# Patient Record
Sex: FEMALE | Race: WHITE | Employment: FULL TIME | URBAN - METROPOLITAN AREA
[De-identification: names, ages, dates, MRNs, and addresses within clinical notes are randomized per-mention and may not be internally consistent; named-entity substitution may affect disease eponyms.]

---

## 2017-05-16 ENCOUNTER — OFFICE VISIT (OUTPATIENT)
Dept: FAMILY MEDICINE CLINIC | Age: 47
End: 2017-05-16
Payer: COMMERCIAL

## 2017-05-16 VITALS
DIASTOLIC BLOOD PRESSURE: 70 MMHG | BODY MASS INDEX: 23.01 KG/M2 | WEIGHT: 146.6 LBS | HEIGHT: 67 IN | SYSTOLIC BLOOD PRESSURE: 122 MMHG | HEART RATE: 68 BPM

## 2017-05-16 DIAGNOSIS — Z51.81 MEDICATION MONITORING ENCOUNTER: ICD-10-CM

## 2017-05-16 DIAGNOSIS — Z13.220 SCREENING FOR LIPID DISORDERS: ICD-10-CM

## 2017-05-16 DIAGNOSIS — Z00.00 ROUTINE GENERAL MEDICAL EXAMINATION AT A HEALTH CARE FACILITY: Primary | ICD-10-CM

## 2017-05-16 PROCEDURE — 99396 PREV VISIT EST AGE 40-64: CPT | Performed by: FAMILY MEDICINE

## 2017-05-16 RX ORDER — MONTELUKAST SODIUM 10 MG/1
10 TABLET ORAL DAILY
Qty: 90 TABLET | Refills: 3 | Status: SHIPPED | OUTPATIENT
Start: 2017-05-16 | End: 2018-07-05 | Stop reason: ALTCHOICE

## 2017-05-16 ASSESSMENT — ENCOUNTER SYMPTOMS
BLOOD IN STOOL: 0
NAUSEA: 0
ABDOMINAL PAIN: 0
COLOR CHANGE: 0
EYE DISCHARGE: 0
WHEEZING: 0
ABDOMINAL DISTENTION: 0
EYE PAIN: 0
PHOTOPHOBIA: 0
EYE REDNESS: 0
FACIAL SWELLING: 0
RHINORRHEA: 0
SINUS PRESSURE: 0
DIARRHEA: 0
VOMITING: 0
EYE ITCHING: 0
CHEST TIGHTNESS: 0
SHORTNESS OF BREATH: 0
SORE THROAT: 0
BACK PAIN: 0
COUGH: 0
CONSTIPATION: 0
VOICE CHANGE: 0

## 2017-05-18 ENCOUNTER — HOSPITAL ENCOUNTER (OUTPATIENT)
Age: 47
Setting detail: SPECIMEN
Discharge: HOME | End: 2017-05-18
Payer: COMMERCIAL

## 2017-05-18 DIAGNOSIS — Z13.220 SCREENING FOR LIPID DISORDERS: ICD-10-CM

## 2017-05-18 DIAGNOSIS — Z51.81 MEDICATION MONITORING ENCOUNTER: ICD-10-CM

## 2017-05-18 LAB
ALBUMIN SERPL-MCNC: 4.2 G/DL (ref 3.5–5.2)
ALBUMIN/GLOBULIN RATIO: 1.5 (ref 1–2.5)
ALP BLD-CCNC: 64 U/L (ref 35–104)
ALT SERPL-CCNC: 15 U/L (ref 5–33)
ANION GAP SERPL CALCULATED.3IONS-SCNC: 11 MMOL/L (ref 9–17)
AST SERPL-CCNC: 17 U/L
BILIRUB SERPL-MCNC: 0.24 MG/DL (ref 0.3–1.2)
BUN BLDV-MCNC: 15 MG/DL (ref 6–20)
BUN/CREAT BLD: ABNORMAL (ref 9–20)
CALCIUM SERPL-MCNC: 9.5 MG/DL (ref 8.6–10.4)
CHLORIDE BLD-SCNC: 104 MMOL/L (ref 98–107)
CHOLESTEROL/HDL RATIO: 3
CHOLESTEROL: 180 MG/DL
CO2: 26 MMOL/L (ref 20–31)
CREAT SERPL-MCNC: 0.61 MG/DL (ref 0.5–0.9)
GFR AFRICAN AMERICAN: >60 ML/MIN
GFR NON-AFRICAN AMERICAN: >60 ML/MIN
GFR SERPL CREATININE-BSD FRML MDRD: ABNORMAL ML/MIN/{1.73_M2}
GFR SERPL CREATININE-BSD FRML MDRD: ABNORMAL ML/MIN/{1.73_M2}
GLUCOSE BLD-MCNC: 92 MG/DL (ref 70–99)
HDLC SERPL-MCNC: 61 MG/DL
LDL CHOLESTEROL: 108 MG/DL (ref 0–130)
POTASSIUM SERPL-SCNC: 4.6 MMOL/L (ref 3.7–5.3)
SODIUM BLD-SCNC: 141 MMOL/L (ref 135–144)
TOTAL PROTEIN: 7 G/DL (ref 6.4–8.3)
TRIGL SERPL-MCNC: 53 MG/DL
VLDLC SERPL CALC-MCNC: NORMAL MG/DL (ref 1–30)

## 2017-06-15 ENCOUNTER — OFFICE VISIT (OUTPATIENT)
Dept: FAMILY MEDICINE CLINIC | Age: 47
End: 2017-06-15
Payer: COMMERCIAL

## 2017-06-15 VITALS
WEIGHT: 150.2 LBS | DIASTOLIC BLOOD PRESSURE: 72 MMHG | HEART RATE: 54 BPM | BODY MASS INDEX: 24.14 KG/M2 | OXYGEN SATURATION: 99 % | SYSTOLIC BLOOD PRESSURE: 100 MMHG | HEIGHT: 66 IN

## 2017-06-15 DIAGNOSIS — G89.29 CHRONIC BILATERAL LOW BACK PAIN WITH BILATERAL SCIATICA: Primary | ICD-10-CM

## 2017-06-15 DIAGNOSIS — M54.41 CHRONIC BILATERAL LOW BACK PAIN WITH BILATERAL SCIATICA: Primary | ICD-10-CM

## 2017-06-15 DIAGNOSIS — M54.42 CHRONIC BILATERAL LOW BACK PAIN WITH BILATERAL SCIATICA: Primary | ICD-10-CM

## 2017-06-15 PROCEDURE — 99213 OFFICE O/P EST LOW 20 MIN: CPT | Performed by: FAMILY MEDICINE

## 2017-06-15 RX ORDER — PREDNISONE 50 MG/1
50 TABLET ORAL EVERY MORNING
Qty: 10 TABLET | Refills: 0 | Status: SHIPPED | OUTPATIENT
Start: 2017-06-15 | End: 2017-06-25

## 2017-06-15 ASSESSMENT — ENCOUNTER SYMPTOMS
SHORTNESS OF BREATH: 0
SORE THROAT: 0
EYE PAIN: 0
COUGH: 0
CHEST TIGHTNESS: 0
SINUS PRESSURE: 0
EYE REDNESS: 0
RHINORRHEA: 0
EYE ITCHING: 0
WHEEZING: 0
NAUSEA: 0
COLOR CHANGE: 0
BACK PAIN: 0
CONSTIPATION: 0
DIARRHEA: 0
PHOTOPHOBIA: 0
VOICE CHANGE: 0
ABDOMINAL DISTENTION: 0
EYE DISCHARGE: 0
FACIAL SWELLING: 0
BLOOD IN STOOL: 0
VOMITING: 0
ABDOMINAL PAIN: 0

## 2017-06-15 ASSESSMENT — PATIENT HEALTH QUESTIONNAIRE - PHQ9
SUM OF ALL RESPONSES TO PHQ9 QUESTIONS 1 & 2: 0
1. LITTLE INTEREST OR PLEASURE IN DOING THINGS: 0
2. FEELING DOWN, DEPRESSED OR HOPELESS: 0
SUM OF ALL RESPONSES TO PHQ QUESTIONS 1-9: 0

## 2017-10-26 ENCOUNTER — PATIENT MESSAGE (OUTPATIENT)
Dept: FAMILY MEDICINE CLINIC | Age: 47
End: 2017-10-26

## 2017-10-27 ENCOUNTER — PATIENT MESSAGE (OUTPATIENT)
Dept: FAMILY MEDICINE CLINIC | Age: 47
End: 2017-10-27

## 2017-10-27 NOTE — TELEPHONE ENCOUNTER
From: Ara Strickland  To: Dariana Rudolph MD  Sent: 10/27/2017 4:27 PM EDT  Subject: Non-Urgent Medical Question    Thank you [facility]  ----- Message -----  From: Dominique Judge  Sent: 10/27/2017 10:08 AM EDT  To: Ara Strickland  Subject: RE: Non-Urgent Medical Question  Thanks for the information. I have updated your Health Maintenance in your chart. Take care,  [facility]    ----- Message -----   From: Ara Strickland   Sent: 10/26/2017 7:10 PM EDT   To: Dariana Rudolph MD  Subject: Non-Urgent Medical Question    Just want to update my chart. Dr. Bari Hanna did my pap smear April 11th. Had my flu shot October 5th.  Scheduled for a mammogram on November 21st.    Thank you,  Ara Strickland

## 2017-10-27 NOTE — TELEPHONE ENCOUNTER
From: Marcelle Charles  To: Gabriella Woodruff MD  Sent: 10/26/2017 7:10 PM EDT  Subject: Non-Urgent Medical Question    Just want to update my chart. Dr. Gara Hodgkin did my pap smear April 11th. Had my flu shot October 5th.  Scheduled for a mammogram on November 21st.    Thank you,  Marcelle Charles

## 2018-05-29 ENCOUNTER — PATIENT MESSAGE (OUTPATIENT)
Dept: FAMILY MEDICINE CLINIC | Age: 48
End: 2018-05-29

## 2018-07-05 ENCOUNTER — OFFICE VISIT (OUTPATIENT)
Dept: FAMILY MEDICINE CLINIC | Age: 48
End: 2018-07-05
Payer: COMMERCIAL

## 2018-07-05 ENCOUNTER — HOSPITAL ENCOUNTER (OUTPATIENT)
Age: 48
Setting detail: SPECIMEN
Discharge: HOME | End: 2018-07-05
Payer: COMMERCIAL

## 2018-07-05 VITALS
DIASTOLIC BLOOD PRESSURE: 72 MMHG | HEIGHT: 67 IN | OXYGEN SATURATION: 98 % | WEIGHT: 164.2 LBS | BODY MASS INDEX: 25.77 KG/M2 | HEART RATE: 53 BPM | SYSTOLIC BLOOD PRESSURE: 114 MMHG

## 2018-07-05 DIAGNOSIS — Z00.00 EXAMINATION, MEDICAL, GENERAL: Primary | ICD-10-CM

## 2018-07-05 DIAGNOSIS — Z13.1 SCREENING FOR DIABETES MELLITUS: ICD-10-CM

## 2018-07-05 DIAGNOSIS — Z00.00 EXAMINATION, MEDICAL, GENERAL: ICD-10-CM

## 2018-07-05 DIAGNOSIS — Z13.220 SCREENING FOR LIPID DISORDERS: ICD-10-CM

## 2018-07-05 DIAGNOSIS — Z23 IMMUNIZATION DUE: ICD-10-CM

## 2018-07-05 PROBLEM — J30.89 CHRONIC NON-SEASONAL ALLERGIC RHINITIS: Status: ACTIVE | Noted: 2018-07-05

## 2018-07-05 LAB
CHOLESTEROL/HDL RATIO: 3.1
CHOLESTEROL: 178 MG/DL
GLUCOSE BLD-MCNC: 85 MG/DL (ref 70–99)
HDLC SERPL-MCNC: 58 MG/DL
LDL CHOLESTEROL: 106 MG/DL (ref 0–130)
TRIGL SERPL-MCNC: 69 MG/DL
VLDLC SERPL CALC-MCNC: NORMAL MG/DL (ref 1–30)

## 2018-07-05 PROCEDURE — 99396 PREV VISIT EST AGE 40-64: CPT | Performed by: FAMILY MEDICINE

## 2018-07-05 PROCEDURE — 90471 IMMUNIZATION ADMIN: CPT | Performed by: FAMILY MEDICINE

## 2018-07-05 PROCEDURE — 90715 TDAP VACCINE 7 YRS/> IM: CPT | Performed by: FAMILY MEDICINE

## 2018-07-05 ASSESSMENT — ENCOUNTER SYMPTOMS
NAUSEA: 0
BACK PAIN: 0
COUGH: 0
COLOR CHANGE: 0
SINUS PRESSURE: 0
RHINORRHEA: 0
SORE THROAT: 0
WHEEZING: 0
CHEST TIGHTNESS: 0
EYE DISCHARGE: 0
PHOTOPHOBIA: 0
ABDOMINAL DISTENTION: 0
EYE PAIN: 0
VOMITING: 0
VOICE CHANGE: 0
EYE REDNESS: 0
ABDOMINAL PAIN: 0
EYE ITCHING: 0
CONSTIPATION: 0
DIARRHEA: 0
SHORTNESS OF BREATH: 0
FACIAL SWELLING: 0
BLOOD IN STOOL: 0

## 2018-07-05 ASSESSMENT — PATIENT HEALTH QUESTIONNAIRE - PHQ9
SUM OF ALL RESPONSES TO PHQ QUESTIONS 1-9: 0
1. LITTLE INTEREST OR PLEASURE IN DOING THINGS: 0
2. FEELING DOWN, DEPRESSED OR HOPELESS: 0
SUM OF ALL RESPONSES TO PHQ9 QUESTIONS 1 & 2: 0

## 2018-07-05 NOTE — PROGRESS NOTES
Constitutional: She is oriented to person, place, and time. She appears well-developed and well-nourished. She does not appear ill. No distress. HENT:   Head: Normocephalic and atraumatic. Right Ear: External ear normal.   Left Ear: External ear normal.   Nose: Nose normal. No mucosal edema or rhinorrhea. Mouth/Throat: Mucous membranes are normal. No oropharyngeal exudate, posterior oropharyngeal edema or posterior oropharyngeal erythema. Eyes: EOM are normal. Pupils are equal, round, and reactive to light. Right eye exhibits no discharge. Left eye exhibits no discharge. No scleral icterus. Neck: Trachea normal and normal range of motion. Neck supple. No JVD present. Carotid bruit is not present. No tracheal deviation present. No thyromegaly present. Cardiovascular: Normal rate, regular rhythm, S1 normal, S2 normal, normal heart sounds and normal pulses. Exam reveals no gallop, no S3, no S4, no distant heart sounds and no friction rub. No murmur heard. Pulmonary/Chest: No respiratory distress. She has no decreased breath sounds. She has no wheezes. She has no rhonchi. She has no rales. Abdominal: Soft. Normal appearance and bowel sounds are normal. There is no hepatosplenomegaly. There is no tenderness. There is no CVA tenderness. No hernia. Lymphadenopathy:     She has no cervical adenopathy. Right cervical: No superficial cervical adenopathy present. Left cervical: No superficial cervical adenopathy present. Neurological: She is alert and oriented to person, place, and time. She has normal strength. No cranial nerve deficit or sensory deficit. Coordination and gait normal.   Reflex Scores:       Brachioradialis reflexes are 2+ on the right side and 2+ on the left side. Patellar reflexes are 2+ on the right side and 2+ on the left side. Achilles reflexes are 2+ on the right side and 2+ on the left side. Skin: Skin is warm and dry. No lesion and no rash noted.  She is not diaphoretic. No cyanosis. Nails show no clubbing. Psychiatric: She has a normal mood and affect. Her speech is normal and behavior is normal. Judgment and thought content normal. Cognition and memory are normal.     Vitals:    07/05/18 0818   BP: 114/72   Pulse: 53   SpO2: 98%   Weight: 164 lb 3.2 oz (74.5 kg)   Height: 5' 6.5\" (1.689 m)         Assessment:          Plan:      1. Examination, medical, general  - I generally recommend that people of all ages try to get 150 minutes of physical activity per week and it doesnt matter how this totals up, in other words 30 minutes 5 days per week is as good as 50 minutes 3 days a week and so on. The level of activity should be such that it is able to get your heart rate up to 100 or more, for example a brisk walk should achieve this rate. - In terms of diet, I generally recommend trying to avoid processed foods wherever possible (anything that comes in a can or a box) which can be achieved by sticking to the outside walls of the grocery store where generally you will find fresh fruits/vegetables, meats, dairy, and frozen foods.    - Try to avoid starches in the diet where possible and minimize bread, rice, potatoes, and pasta in the diet. Specifically try to avoid gluten, which even in people that dont have a kerry allergy, causes havoc in the small intestine and alters absorption of nutrients which can in turn lead to obesity.   - Try to achieve a regular sleep schedule, waking and laying down at the same time each night. Most people need 7 hours per night plus or minus 2 hours. You will know that youre getting enough because you will wake feeling refreshed and not need to sleep in to catch up on weekends.    - Make sure that you dont neglect your skin, I recommend an SPF 30 or higher sun screen any time that you plan to be in the sun for more than 20 minutes, even in the winter or on cloudy days (keep in mind that UV light penetrates clouds and can cause burns even on cloudy days). - Finally, make sure that you always have something to look forward to whether this is a vacation, a special event, or just a weekend off work. Having something to look forward to helps to maintain positive focus and is good for mental health. - Tdap (age 10y-63y) IM (ADACEL)  - Lipid Panel; Future  - Glucose; Future    2. Screening for lipid disorders  - Lipid Panel; Future    3. Screening for diabetes mellitus  - Glucose; Future    4.  Immunization due  - Tdap (age 10y-63y) IM (ADACEL)

## 2018-07-05 NOTE — PATIENT INSTRUCTIONS
brisk walk again on Thursday, resistance training on your core on Friday, brisk walk again on Saturday, and then start the cycle over. Swimming is an example of exercise that builds muscle and works your heart at the same time. You may want to consider subscribing to a magazine such as [address] or [address] which both have great diet and exercise tips in them.

## 2018-08-26 ENCOUNTER — PATIENT MESSAGE (OUTPATIENT)
Dept: FAMILY MEDICINE CLINIC | Age: 48
End: 2018-08-26

## 2019-08-14 ENCOUNTER — OFFICE VISIT (OUTPATIENT)
Dept: FAMILY MEDICINE CLINIC | Age: 49
End: 2019-08-14
Payer: COMMERCIAL

## 2019-08-14 VITALS
SYSTOLIC BLOOD PRESSURE: 106 MMHG | HEART RATE: 53 BPM | HEIGHT: 66 IN | DIASTOLIC BLOOD PRESSURE: 60 MMHG | BODY MASS INDEX: 25.65 KG/M2 | OXYGEN SATURATION: 98 % | WEIGHT: 159.6 LBS

## 2019-08-14 DIAGNOSIS — Z13.220 SCREENING FOR LIPID DISORDERS: ICD-10-CM

## 2019-08-14 DIAGNOSIS — Z00.01 ABNORMAL PHYSICAL EVALUATION: Primary | ICD-10-CM

## 2019-08-14 DIAGNOSIS — Z11.4 SCREENING FOR HIV (HUMAN IMMUNODEFICIENCY VIRUS): ICD-10-CM

## 2019-08-14 DIAGNOSIS — Z51.81 MEDICATION MONITORING ENCOUNTER: ICD-10-CM

## 2019-08-14 PROCEDURE — 99396 PREV VISIT EST AGE 40-64: CPT | Performed by: FAMILY MEDICINE

## 2019-08-14 ASSESSMENT — ENCOUNTER SYMPTOMS
SORE THROAT: 0
EYE REDNESS: 0
VOMITING: 0
EYE DISCHARGE: 0
FACIAL SWELLING: 0
SHORTNESS OF BREATH: 0
COLOR CHANGE: 0
RHINORRHEA: 0
CHEST TIGHTNESS: 0
PHOTOPHOBIA: 0
SINUS PRESSURE: 0
DIARRHEA: 0
NAUSEA: 0
VOICE CHANGE: 0
COUGH: 0
BACK PAIN: 0
EYE PAIN: 0
ABDOMINAL PAIN: 0
ABDOMINAL DISTENTION: 0
BLOOD IN STOOL: 0
WHEEZING: 0
EYE ITCHING: 0
CONSTIPATION: 0

## 2019-08-14 ASSESSMENT — PATIENT HEALTH QUESTIONNAIRE - PHQ9
SUM OF ALL RESPONSES TO PHQ QUESTIONS 1-9: 0
1. LITTLE INTEREST OR PLEASURE IN DOING THINGS: 0
SUM OF ALL RESPONSES TO PHQ9 QUESTIONS 1 & 2: 0
SUM OF ALL RESPONSES TO PHQ QUESTIONS 1-9: 0
2. FEELING DOWN, DEPRESSED OR HOPELESS: 0

## 2019-08-14 NOTE — PROGRESS NOTES
refreshed and not need to sleep in to catch up on weekends. - Make sure that you dont neglect your skin, I recommend an SPF 30 or higher sun screen any time that you plan to be in the sun for more than 20 minutes, even in the winter or on cloudy days (keep in mind that UV light penetrates clouds and can cause burns even on cloudy days). - Finally, make sure that you always have something to look forward to whether this is a vacation, a special event, or just a weekend off work. Having something to look forward to helps to maintain positive focus and is good for mental health. - Lipid Panel; Future  - Comprehensive Metabolic Panel; Future    2. Screening for lipid disorders  - Lipid Panel; Future    3. Screening for HIV (human immunodeficiency virus)  - HIV Screen; Future    4. Medication monitoring encounter  - Comprehensive Metabolic Panel;  Future

## 2019-08-14 NOTE — PATIENT INSTRUCTIONS
For the tail bone pain, try using 600mg of ibuprofen every 6 hours for 10 days (three over the counter pills per dose) and when on poorly cushioned chairs make sure there is lumbar support to prevent placing pressure on the tailbone. If there is any ear pain beyond about 7 days using the nasacort, call or send me a Tunespeak message to let me know. - I generally recommend that people of all ages try to get 150 minutes of physical activity per week and it doesnt matter how this totals up, in other words 30 minutes 5 days per week is as good as 50 minutes 3 days a week and so on. The level of activity should be such that it is able to get your heart rate up to 100 or more, for example a brisk walk should achieve this rate. - In terms of diet, I generally recommend trying to avoid processed foods wherever possible (anything that comes in a can or a box) which can be achieved by sticking to the outside walls of the grocery store where generally you will find fresh fruits/vegetables, meats, dairy, and frozen foods.    - Try to avoid starches in the diet where possible and minimize bread, rice, potatoes, and pasta in the diet. Specifically try to avoid gluten, which even in people that dont have a kerry allergy, causes havoc in the small intestine and alters absorption of nutrients which can in turn lead to obesity.   - Try to achieve a regular sleep schedule, waking and laying down at the same time each night. Most people need 7 hours per night plus or minus 2 hours. You will know that youre getting enough because you will wake feeling refreshed and not need to sleep in to catch up on weekends. - Make sure that you dont neglect your skin, I recommend an SPF 30 or higher sun screen any time that you plan to be in the sun for more than 20 minutes, even in the winter or on cloudy days (keep in mind that UV light penetrates clouds and can cause burns even on cloudy days).    - Finally, make sure that you

## 2019-08-22 ENCOUNTER — HOSPITAL ENCOUNTER (OUTPATIENT)
Age: 49
Discharge: HOME | End: 2019-08-22
Payer: COMMERCIAL

## 2019-08-22 DIAGNOSIS — Z00.01 ABNORMAL PHYSICAL EVALUATION: ICD-10-CM

## 2019-08-22 DIAGNOSIS — Z13.220 SCREENING FOR LIPID DISORDERS: ICD-10-CM

## 2019-08-22 DIAGNOSIS — Z51.81 MEDICATION MONITORING ENCOUNTER: ICD-10-CM

## 2019-08-22 DIAGNOSIS — Z11.4 SCREENING FOR HIV (HUMAN IMMUNODEFICIENCY VIRUS): ICD-10-CM

## 2019-08-22 LAB
ALBUMIN SERPL-MCNC: 4.4 G/DL (ref 3.5–5.2)
ALBUMIN/GLOBULIN RATIO: 1.8 (ref 1–2.5)
ALP BLD-CCNC: 59 U/L (ref 35–104)
ALT SERPL-CCNC: 18 U/L (ref 5–33)
ANION GAP SERPL CALCULATED.3IONS-SCNC: 14 MMOL/L (ref 9–17)
AST SERPL-CCNC: 18 U/L
BILIRUB SERPL-MCNC: 0.45 MG/DL (ref 0.3–1.2)
BUN BLDV-MCNC: 14 MG/DL (ref 6–20)
BUN/CREAT BLD: NORMAL (ref 9–20)
CALCIUM SERPL-MCNC: 9.5 MG/DL (ref 8.6–10.4)
CHLORIDE BLD-SCNC: 106 MMOL/L (ref 98–107)
CHOLESTEROL/HDL RATIO: 3.1
CHOLESTEROL: 184 MG/DL
CO2: 24 MMOL/L (ref 20–31)
CREAT SERPL-MCNC: 0.59 MG/DL (ref 0.5–0.9)
GFR AFRICAN AMERICAN: >60 ML/MIN
GFR NON-AFRICAN AMERICAN: >60 ML/MIN
GFR SERPL CREATININE-BSD FRML MDRD: NORMAL ML/MIN/{1.73_M2}
GFR SERPL CREATININE-BSD FRML MDRD: NORMAL ML/MIN/{1.73_M2}
GLUCOSE BLD-MCNC: 96 MG/DL (ref 70–99)
HDLC SERPL-MCNC: 60 MG/DL
HIV AG/AB: NONREACTIVE
LDL CHOLESTEROL: 113 MG/DL (ref 0–130)
POTASSIUM SERPL-SCNC: 4.3 MMOL/L (ref 3.7–5.3)
SODIUM BLD-SCNC: 144 MMOL/L (ref 135–144)
TOTAL PROTEIN: 6.9 G/DL (ref 6.4–8.3)
TRIGL SERPL-MCNC: 57 MG/DL
VLDLC SERPL CALC-MCNC: NORMAL MG/DL (ref 1–30)

## 2019-08-22 PROCEDURE — 87389 HIV-1 AG W/HIV-1&-2 AB AG IA: CPT

## 2019-08-22 PROCEDURE — 36415 COLL VENOUS BLD VENIPUNCTURE: CPT

## 2019-08-22 PROCEDURE — 80053 COMPREHEN METABOLIC PANEL: CPT

## 2019-08-22 PROCEDURE — 80061 LIPID PANEL: CPT

## 2019-08-26 ENCOUNTER — TELEPHONE (OUTPATIENT)
Dept: FAMILY MEDICINE CLINIC | Age: 49
End: 2019-08-26

## 2020-03-05 ENCOUNTER — OFFICE VISIT (OUTPATIENT)
Dept: PRIMARY CARE CLINIC | Age: 50
End: 2020-03-05
Payer: COMMERCIAL

## 2020-03-05 VITALS
HEART RATE: 58 BPM | OXYGEN SATURATION: 98 % | SYSTOLIC BLOOD PRESSURE: 120 MMHG | WEIGHT: 172.8 LBS | BODY MASS INDEX: 27.12 KG/M2 | HEIGHT: 67 IN | DIASTOLIC BLOOD PRESSURE: 70 MMHG | RESPIRATION RATE: 16 BRPM

## 2020-03-05 PROCEDURE — 99386 PREV VISIT NEW AGE 40-64: CPT | Performed by: NURSE PRACTITIONER

## 2020-03-05 RX ORDER — ASCORBIC ACID 500 MG
500 TABLET ORAL DAILY
COMMUNITY

## 2020-03-05 RX ORDER — SCOLOPAMINE TRANSDERMAL SYSTEM 1 MG/1
1 PATCH, EXTENDED RELEASE TRANSDERMAL
Qty: 10 PATCH | Refills: 11 | Status: SHIPPED | OUTPATIENT
Start: 2020-03-05 | End: 2021-01-14 | Stop reason: ALTCHOICE

## 2020-03-05 RX ORDER — IBUPROFEN 200 MG
200 TABLET ORAL EVERY 6 HOURS PRN
COMMUNITY

## 2020-03-05 ASSESSMENT — ENCOUNTER SYMPTOMS
SHORTNESS OF BREATH: 0
COUGH: 0
BACK PAIN: 0
ABDOMINAL PAIN: 0

## 2020-03-05 NOTE — PROGRESS NOTES
standard drinks      Current Outpatient Medications   Medication Sig Dispense Refill    vitamin C (ASCORBIC ACID) 500 MG tablet Take 500 mg by mouth daily      VITAMIN D PO Take by mouth      Glucosamine-MSM-Hyaluronic Acd (JOINT HEALTH PO) Take by mouth      Doxylamine Succinate, Sleep, (UNISOM PO) Take by mouth      ibuprofen (ADVIL;MOTRIN) 200 MG tablet Take 200 mg by mouth every 6 hours as needed for Pain       No current facility-administered medications for this visit. No Known Allergies    Health Maintenance   Topic Date Due    Shingles Vaccine (1 of 2) 12/05/2021    Lipid screen  08/22/2024    DTaP/Tdap/Td vaccine (2 - Td) 07/05/2028    Flu vaccine  Completed    HIV screen  Completed    Hepatitis A vaccine  Aged Out    Hepatitis B vaccine  Aged Out    Hib vaccine  Aged Out    Meningococcal (ACWY) vaccine  Aged Out    Pneumococcal 0-64 years Vaccine  Aged Out       Subjective:      Review of Systems   Constitutional: Negative for chills, fatigue and fever. HENT: Negative for congestion. Eyes: Negative for visual disturbance. Respiratory: Negative for cough and shortness of breath. Cardiovascular: Negative for chest pain and palpitations. Gastrointestinal: Negative for abdominal pain. Genitourinary: Negative for dysuria. Musculoskeletal: Negative for back pain. Neurological: Negative for dizziness, numbness and headaches. Psychiatric/Behavioral: Negative for self-injury, sleep disturbance and suicidal ideas. The patient is not nervous/anxious. Objective:     Physical Exam  Vitals signs and nursing note reviewed. Constitutional:       Appearance: She is well-developed. HENT:      Head: Normocephalic and atraumatic. Eyes:      Pupils: Pupils are equal, round, and reactive to light. Neck:      Musculoskeletal: Normal range of motion and neck supple. Cardiovascular:      Rate and Rhythm: Normal rate and regular rhythm. Heart sounds: Normal heart sounds. maintenance. Instructedto continue current medications, diet and exercise. Patient agreed with treatmentplan. Follow up as directed.      Electronicallysigned by JOHN Farris CNP on 3/5/2020 at 10:49 AM

## 2020-03-23 ENCOUNTER — OFFICE VISIT (OUTPATIENT)
Dept: PRIMARY CARE CLINIC | Age: 50
End: 2020-03-23
Payer: COMMERCIAL

## 2020-03-23 VITALS
OXYGEN SATURATION: 98 % | DIASTOLIC BLOOD PRESSURE: 62 MMHG | HEART RATE: 50 BPM | WEIGHT: 171.6 LBS | RESPIRATION RATE: 16 BRPM | SYSTOLIC BLOOD PRESSURE: 116 MMHG | HEIGHT: 66 IN | BODY MASS INDEX: 27.58 KG/M2 | TEMPERATURE: 97.9 F

## 2020-03-23 LAB — S PYO AG THROAT QL: NORMAL

## 2020-03-23 PROCEDURE — 87880 STREP A ASSAY W/OPTIC: CPT | Performed by: NURSE PRACTITIONER

## 2020-03-23 PROCEDURE — 99213 OFFICE O/P EST LOW 20 MIN: CPT | Performed by: NURSE PRACTITIONER

## 2020-03-23 RX ORDER — AZITHROMYCIN 250 MG/1
TABLET, FILM COATED ORAL
Qty: 6 TABLET | Refills: 0 | Status: SHIPPED | OUTPATIENT
Start: 2020-03-23 | End: 2020-04-02

## 2020-03-23 ASSESSMENT — ENCOUNTER SYMPTOMS
COUGH: 0
SORE THROAT: 1
SHORTNESS OF BREATH: 0
BACK PAIN: 0
ABDOMINAL PAIN: 0
SINUS PAIN: 1
SINUS PRESSURE: 1

## 2020-03-23 NOTE — LETTER
[facility]  [address]  [facility] [address]  Phone: [phone]  Fax: [phone]    JOHN Lynne CNP        March 23, 2020     Patient: Aranza Pantoja   YOB: 1970   Date of Visit: 3/23/2020       To Whom It May Concern: It is my medical opinion that Aranza Pantoja was seen in our office today. She may return to work on 3/24/20 without restrictions. If you have any questions or concerns, please don't hesitate to call.     Sincerely,        JOHN Lynne CNP

## 2020-03-23 NOTE — PROGRESS NOTES
[address] PRIMARY CARE  [facility] [address] [facility] [address]  [address] [address]  Dept: [phone]  Dept Fax: [phone]    Adriana Huffman is a 51 y.o. female who presentstoday for her medical conditions/complaints as noted below. Adriana Huffman is c/o of  Chief Complaint   Patient presents with    Pharyngitis     started about 2 weeks ago, hurts to swallow, followed by coughing          HPI:     Presents for acute visit  Sore throat and congestion x 1 week  No improvement with improvement with OTC  Continues to work, will need note that she is able to return    Denies any other problems/concerns      No results found for: LABA1C          ( goal A1C is < 7)   No results found for: LABMICR  LDL Cholesterol (mg/dL)   Date Value   2019 113   2018 106   2017 108     LDL Calculated (mg/dL)   Date Value   2013 117       (goal LDL is <100)   AST (U/L)   Date Value   2019 18     ALT (U/L)   Date Value   2019 18     BUN (mg/dL)   Date Value   2019 14     BP Readings from Last 3 Encounters:   20 116/62   20 120/70   19 106/60          ([id number])    History reviewed. No pertinent past medical history. Past Surgical History:   Procedure Laterality Date    HYSTERECTOMY, TOTAL ABDOMINAL      left ovarier        Family History   Problem Relation Age of Onset    Brain Cancer Father           Social History     Tobacco Use    Smoking status: Never Smoker    Smokeless tobacco: Never Used   Substance Use Topics    Alcohol use: Yes     Alcohol/week: 0.0 standard drinks      Current Outpatient Medications   Medication Sig Dispense Refill    azithromycin (ZITHROMAX) 250 MG tablet 2 tablets by mouth on day one.   Then, 1 tablet by mouth daily for days 2-5. 6 tablet 0    vitamin C (ASCORBIC ACID) 500 MG tablet Take 500 mg by mouth daily      VITAMIN D PO Take by mouth      Glucosamine-MSM-Hyaluronic Acd (JOINT HEALTH PO) Take by

## 2020-08-18 ENCOUNTER — HOSPITAL ENCOUNTER (OUTPATIENT)
Dept: GENERAL RADIOLOGY | Age: 50
Discharge: HOME | End: 2020-08-20
Payer: COMMERCIAL

## 2020-08-18 ENCOUNTER — HOSPITAL ENCOUNTER (OUTPATIENT)
Age: 50
Discharge: HOME | End: 2020-08-20
Payer: COMMERCIAL

## 2020-08-18 PROCEDURE — 72220 X-RAY EXAM SACRUM TAILBONE: CPT

## 2020-08-28 ENCOUNTER — HOSPITAL ENCOUNTER (OUTPATIENT)
Age: 50
Discharge: HOME | End: 2020-08-28
Payer: COMMERCIAL

## 2020-08-28 LAB
ALBUMIN SERPL-MCNC: 4.2 G/DL (ref 3.5–5.2)
ALBUMIN/GLOBULIN RATIO: 1.8 (ref 1–2.5)
ALP BLD-CCNC: 60 U/L (ref 35–104)
ALT SERPL-CCNC: 15 U/L (ref 5–33)
ANION GAP SERPL CALCULATED.3IONS-SCNC: 11 MMOL/L (ref 9–17)
AST SERPL-CCNC: 17 U/L
BILIRUB SERPL-MCNC: 0.44 MG/DL (ref 0.3–1.2)
BUN BLDV-MCNC: 15 MG/DL (ref 6–20)
BUN/CREAT BLD: ABNORMAL (ref 9–20)
CALCIUM SERPL-MCNC: 9.5 MG/DL (ref 8.6–10.4)
CHLORIDE BLD-SCNC: 106 MMOL/L (ref 98–107)
CHOLESTEROL/HDL RATIO: 3.7
CHOLESTEROL: 204 MG/DL
CO2: 25 MMOL/L (ref 20–31)
CREAT SERPL-MCNC: 0.62 MG/DL (ref 0.5–0.9)
GFR AFRICAN AMERICAN: >60 ML/MIN
GFR NON-AFRICAN AMERICAN: >60 ML/MIN
GFR SERPL CREATININE-BSD FRML MDRD: ABNORMAL ML/MIN/{1.73_M2}
GFR SERPL CREATININE-BSD FRML MDRD: ABNORMAL ML/MIN/{1.73_M2}
GLUCOSE BLD-MCNC: 105 MG/DL (ref 70–99)
HCT VFR BLD CALC: 41.2 % (ref 36.3–47.1)
HDLC SERPL-MCNC: 55 MG/DL
HEMOGLOBIN: 13.4 G/DL (ref 11.9–15.1)
LDL CHOLESTEROL: 135 MG/DL (ref 0–130)
MCH RBC QN AUTO: 30.3 PG (ref 25.2–33.5)
MCHC RBC AUTO-ENTMCNC: 32.5 G/DL (ref 28.4–34.8)
MCV RBC AUTO: 93.2 FL (ref 82.6–102.9)
NRBC AUTOMATED: 0 PER 100 WBC
PDW BLD-RTO: 12.5 % (ref 11.8–14.4)
PLATELET # BLD: 241 K/UL (ref 138–453)
PMV BLD AUTO: 11.6 FL (ref 8.1–13.5)
POTASSIUM SERPL-SCNC: 4.4 MMOL/L (ref 3.7–5.3)
RBC # BLD: 4.42 M/UL (ref 3.95–5.11)
SODIUM BLD-SCNC: 142 MMOL/L (ref 135–144)
TOTAL PROTEIN: 6.6 G/DL (ref 6.4–8.3)
TRIGL SERPL-MCNC: 71 MG/DL
TSH SERPL DL<=0.05 MIU/L-ACNC: 2.42 MIU/L (ref 0.3–5)
VLDLC SERPL CALC-MCNC: ABNORMAL MG/DL (ref 1–30)
WBC # BLD: 6.1 K/UL (ref 3.5–11.3)

## 2020-08-28 PROCEDURE — 36415 COLL VENOUS BLD VENIPUNCTURE: CPT

## 2020-08-28 PROCEDURE — 84443 ASSAY THYROID STIM HORMONE: CPT

## 2020-08-28 PROCEDURE — 85027 COMPLETE CBC AUTOMATED: CPT

## 2020-08-28 PROCEDURE — 80061 LIPID PANEL: CPT

## 2020-08-28 PROCEDURE — 80053 COMPREHEN METABOLIC PANEL: CPT

## 2020-12-10 ENCOUNTER — PATIENT MESSAGE (OUTPATIENT)
Dept: PRIMARY CARE CLINIC | Age: 50
End: 2020-12-10

## 2020-12-10 NOTE — TELEPHONE ENCOUNTER
From: Yesenia Marker  To: Chapin Brothers, APRN - CNP  Sent: 12/10/2020 9:00 AM EST  Subject: Non-Urgent Medical Question    Morning, so my biometric screening paper was not received for my health insurance. Is there anyway you can send it again and print a copy for me to  from your office today or tomorrow?     Thanks,  Yesenia Marker

## 2021-01-14 ENCOUNTER — OFFICE VISIT (OUTPATIENT)
Dept: PRIMARY CARE CLINIC | Age: 51
End: 2021-01-14
Payer: COMMERCIAL

## 2021-01-14 ENCOUNTER — HOSPITAL ENCOUNTER (OUTPATIENT)
Dept: GENERAL RADIOLOGY | Age: 51
Discharge: HOME | End: 2021-01-16
Payer: COMMERCIAL

## 2021-01-14 ENCOUNTER — HOSPITAL ENCOUNTER (OUTPATIENT)
Age: 51
Discharge: HOME | End: 2021-01-16
Payer: COMMERCIAL

## 2021-01-14 VITALS
DIASTOLIC BLOOD PRESSURE: 76 MMHG | OXYGEN SATURATION: 99 % | BODY MASS INDEX: 27.25 KG/M2 | HEIGHT: 67 IN | RESPIRATION RATE: 14 BRPM | SYSTOLIC BLOOD PRESSURE: 118 MMHG | WEIGHT: 173.6 LBS | HEART RATE: 63 BPM

## 2021-01-14 DIAGNOSIS — M25.551 RIGHT HIP PAIN: Primary | ICD-10-CM

## 2021-01-14 DIAGNOSIS — M25.552 LEFT HIP PAIN: ICD-10-CM

## 2021-01-14 DIAGNOSIS — M25.551 RIGHT HIP PAIN: ICD-10-CM

## 2021-01-14 PROCEDURE — 99214 OFFICE O/P EST MOD 30 MIN: CPT | Performed by: NURSE PRACTITIONER

## 2021-01-14 PROCEDURE — 73502 X-RAY EXAM HIP UNI 2-3 VIEWS: CPT

## 2021-01-14 ASSESSMENT — ENCOUNTER SYMPTOMS
ABDOMINAL PAIN: 0
SHORTNESS OF BREATH: 0
COUGH: 0
BACK PAIN: 0

## 2021-01-14 ASSESSMENT — PATIENT HEALTH QUESTIONNAIRE - PHQ9
SUM OF ALL RESPONSES TO PHQ QUESTIONS 1-9: 0
SUM OF ALL RESPONSES TO PHQ QUESTIONS 1-9: 0

## 2021-01-14 NOTE — PROGRESS NOTES
ALLERGY PO) Take by mouth      vitamin C (ASCORBIC ACID) 500 MG tablet Take 500 mg by mouth daily      VITAMIN D PO Take by mouth      Glucosamine-MSM-Hyaluronic Acd (JOINT HEALTH PO) Take by mouth      Doxylamine Succinate, Sleep, (UNISOM PO) Take by mouth      ibuprofen (ADVIL;MOTRIN) 200 MG tablet Take 200 mg by mouth every 6 hours as needed for Pain       No current facility-administered medications for this visit. No Known Allergies    Health Maintenance   Topic Date Due    Hepatitis C screen  1970    Diabetes screen  12/05/2011    Lipid screen  08/28/2025    DTaP/Tdap/Td vaccine (2 - Td) 07/05/2028    Flu vaccine  Completed    HIV screen  Completed    Hepatitis A vaccine  Aged Out    Hepatitis B vaccine  Aged Out    Hib vaccine  Aged Out    Meningococcal (ACWY) vaccine  Aged Out    Pneumococcal 0-64 years Vaccine  Aged Out       Subjective:      Review of Systems   Constitutional: Negative for chills, fatigue and fever. HENT: Negative for congestion. Eyes: Negative for visual disturbance. Respiratory: Negative for cough and shortness of breath. Cardiovascular: Negative for chest pain and palpitations. Gastrointestinal: Negative for abdominal pain. Genitourinary: Negative for dysuria. Musculoskeletal: Positive for arthralgias. Negative for back pain. Neurological: Negative for dizziness, numbness and headaches. Psychiatric/Behavioral: Negative for self-injury, sleep disturbance and suicidal ideas. The patient is not nervous/anxious. Objective:     Physical Exam  Vitals signs and nursing note reviewed. Constitutional:       Appearance: She is well-developed. HENT:      Head: Normocephalic and atraumatic. Eyes:      Pupils: Pupils are equal, round, and reactive to light. Neck:      Musculoskeletal: Normal range of motion and neck supple. Cardiovascular:      Rate and Rhythm: Normal rate and regular rhythm. Heart sounds: Normal heart sounds. Pulmonary:      Effort: Pulmonary effort is normal.      Breath sounds: Normal breath sounds. Abdominal:      General: Bowel sounds are normal.      Palpations: Abdomen is soft. Tenderness: There is no abdominal tenderness. Musculoskeletal: Normal range of motion. Skin:     General: Skin is warm and dry. Neurological:      Mental Status: She is alert and oriented to person, place, and time. Psychiatric:         Behavior: Behavior normal.         Thought Content: Thought content normal.         Judgment: Judgment normal.       /76   Pulse 63   Resp 14   Ht 5' 6.5\" (1.689 m)   Wt 173 lb 9.6 oz (78.7 kg)   SpO2 99%   Breastfeeding No   BMI 27.60 kg/m²     Assessment:       Diagnosis Orders   1. Right hip pain  XR HIP RIGHT (2-3 VIEWS)   2. Left hip pain  XR HIP LEFT (2-3 VIEWS)             Plan:      Return in about 6 months (around 7/14/2021) for annual exam.    1. Bilateral hip pain- Rx given for xray, follow up pending results. Return in six months for recheck/annual exam.    Orders Placed This Encounter   Procedures    XR HIP RIGHT (2-3 VIEWS)     Standing Status:   Future     Standing Expiration Date:   1/14/2022    XR HIP LEFT (2-3 VIEWS)     Standing Status:   Future     Standing Expiration Date:   1/14/2022      No orders of the defined types were placed in this encounter. Patient given educational materials - see patient instructions. Discussed use, benefit, and side effects of prescribed medications. All patientquestions answered. Pt voiced understanding. Reviewed health maintenance. Instructedto continue current medications, diet and exercise. Patient agreed with treatmentplan. Follow up as directed.      Electronicallysigned by JOHN Guallpa CNP on 1/14/2021 at 8:26 AM

## 2021-01-21 ENCOUNTER — OFFICE VISIT (OUTPATIENT)
Dept: ORTHOPEDIC SURGERY | Age: 51
End: 2021-01-21
Payer: COMMERCIAL

## 2021-01-21 ENCOUNTER — TELEPHONE (OUTPATIENT)
Dept: PRIMARY CARE CLINIC | Age: 51
End: 2021-01-21

## 2021-01-21 VITALS — WEIGHT: 173 LBS | BODY MASS INDEX: 27.8 KG/M2 | HEIGHT: 66 IN

## 2021-01-21 DIAGNOSIS — M53.3 COCCYXDYNIA: Primary | ICD-10-CM

## 2021-01-21 DIAGNOSIS — M25.552 LEFT HIP PAIN: ICD-10-CM

## 2021-01-21 DIAGNOSIS — M25.551 RIGHT HIP PAIN: Primary | ICD-10-CM

## 2021-01-21 PROCEDURE — 99203 OFFICE O/P NEW LOW 30 MIN: CPT | Performed by: ORTHOPAEDIC SURGERY

## 2021-01-21 PROCEDURE — 20552 NJX 1/MLT TRIGGER POINT 1/2: CPT | Performed by: ORTHOPAEDIC SURGERY

## 2021-01-21 RX ORDER — BETAMETHASONE SODIUM PHOSPHATE AND BETAMETHASONE ACETATE 3; 3 MG/ML; MG/ML
12 INJECTION, SUSPENSION INTRA-ARTICULAR; INTRALESIONAL; INTRAMUSCULAR; SOFT TISSUE ONCE
Status: COMPLETED | OUTPATIENT
Start: 2021-01-21 | End: 2021-01-21

## 2021-01-21 RX ORDER — LIDOCAINE HYDROCHLORIDE 10 MG/ML
2 INJECTION, SOLUTION INFILTRATION; PERINEURAL ONCE
Status: COMPLETED | OUTPATIENT
Start: 2021-01-21 | End: 2021-01-21

## 2021-01-21 RX ORDER — BUPIVACAINE HYDROCHLORIDE 5 MG/ML
2 INJECTION, SOLUTION PERINEURAL ONCE
Status: COMPLETED | OUTPATIENT
Start: 2021-01-21 | End: 2021-01-21

## 2021-01-21 RX ADMIN — BUPIVACAINE HYDROCHLORIDE 10 MG: 5 INJECTION, SOLUTION PERINEURAL at 15:51

## 2021-01-21 RX ADMIN — BETAMETHASONE SODIUM PHOSPHATE AND BETAMETHASONE ACETATE 12 MG: 3; 3 INJECTION, SUSPENSION INTRA-ARTICULAR; INTRALESIONAL; INTRAMUSCULAR; SOFT TISSUE at 15:50

## 2021-01-21 RX ADMIN — LIDOCAINE HYDROCHLORIDE 2 ML: 10 INJECTION, SOLUTION INFILTRATION; PERINEURAL at 15:52

## 2021-01-21 NOTE — TELEPHONE ENCOUNTER
Patient calling asking if you can make a referral for patient to see someone for bilateral hip pain. Please advise and notify patient of results.

## 2021-01-21 NOTE — PROGRESS NOTES
Patient ID: Joe Walker is a 53 y.o. female. Chief Complaint   Patient presents with    New Patient     Tailbone pain         HPI     Patient presents with progressive coccydynia. This is been ongoing for a significant period of time I believe around 6 months. Patient no history of trauma    Patient with history of sitting causing aggravation of pain    No past medical history on file. Past Surgical History:   Procedure Laterality Date    HYSTERECTOMY, TOTAL ABDOMINAL      left ovarier      Family History   Problem Relation Age of Onset    Brain Cancer Father      Social History     Occupational History    Not on file   Tobacco Use    Smoking status: Never Smoker    Smokeless tobacco: Never Used   Substance and Sexual Activity    Alcohol use: Yes     Alcohol/week: 0.0 standard drinks    Drug use: No    Sexual activity: Not on file        Review of Systems   All other systems reviewed and are negative. Physical Exam  Vitals signs and nursing note reviewed. Constitutional:       Appearance: She is well-developed. HENT:      Head: Normocephalic and atraumatic. Nose: Nose normal.   Eyes:      Conjunctiva/sclera: Conjunctivae normal.   Neck:      Musculoskeletal: Normal range of motion and neck supple. Pulmonary:      Effort: Pulmonary effort is normal. No respiratory distress. Musculoskeletal:      Comments: Normal gait     Skin:     General: Skin is warm and dry. Neurological:      Mental Status: She is alert and oriented to person, place, and time. Sensory: No sensory deficit. Psychiatric:         Behavior: Behavior normal.         Thought Content: Thought content normal.       Diagnostic x-rays AP pelvis lateral sacrum moderately apex dorsal angulated sacrococcygeal joint    Patient is point tender over the coccyx  Assessment:          1.  Coccyxdynia        Plan:     Inject sacrococcygeal joint    Follow-up 3 weeks    MRI sacrum An informed verbal consent for the procedure was obtained and risks including, but not limited to: allergy to medications, injection, bleeding, stiffness of joint, recurrence of symptoms, loss of function, swelling, drainage, irrigation, need for surgery and pseudo-septic inflammation, were explained to the patient. Also, discussed was the potential for further injections, irrigation and debridement and surgery. Alternate means of treatment have also been discussed with the patient. Administrations This Visit     betamethasone acetate-betamethasone sodium phosphate (CELESTONE) injection 12 mg     Admin Date  01/21/2021  15:50 Action  Given Dose  12 mg Route  Intra-articular Site   Administered By  Roxanna Yang, [place]    Ordering Provider: Celia Franklin MD    NDC: 0243-6573-89    Lot#: [id number]    : [id number] [facility]    Patient Supplied?: No    Comments: tailbone          bupivacaine (MARCAINE) 0.5 % injection 10 mg     Admin Date  01/21/2021  15:51 Action  Given Dose  10 mg Route  Intra-articular Site   Administered By  Roxanna Yang MA    Ordering Provider: Celia Franklin MD    NDC: 47568-388-70    Lot#: [id number]    : [facility] [place]    Patient Supplied?: No    Comments: tailbone          lidocaine 1 % injection 2 mL     Admin Date  01/21/2021  15:52 Action  Given Dose  2 mL Route  Intra-articular Site   Administered By  Roxanna Yang MA    Ordering Provider: Celia Franklin MD    NDC: 2870-3226-39    Lot#: [id number]    : [address]    Patient Supplied?: No    Comments: tailbone                  No orders of the defined types were placed in this encounter. Celia Franklin MD    Please note that this chart was generated using voicerecognition Dragon dictation software. Although every effort was made to ensurethe accuracy of this automated transcription, some errors in transcription may haveoccurred.

## 2021-01-28 ENCOUNTER — OFFICE VISIT (OUTPATIENT)
Dept: ORTHOPEDIC SURGERY | Age: 51
End: 2021-01-28
Payer: COMMERCIAL

## 2021-01-28 VITALS — HEIGHT: 66 IN | WEIGHT: 173 LBS | BODY MASS INDEX: 27.8 KG/M2

## 2021-01-28 DIAGNOSIS — M53.3 COCCYXDYNIA: Primary | ICD-10-CM

## 2021-01-28 PROCEDURE — 99213 OFFICE O/P EST LOW 20 MIN: CPT | Performed by: ORTHOPAEDIC SURGERY

## 2021-01-28 NOTE — PROGRESS NOTES
Erika Mathew M.D.            [address] [facility] [address] [facility], [address] [facility]           Dept Phone: [phone]           Dept Fax:  [phone] [address]           Meet [place]          Dept Phone: [phone]           Dept Fax:  [phone]      Chief Compliant:  Chief Complaint   Patient presents with    New Patient     Left hip         History of Present Illness: This is a 53 y.o. female who presents to the clinic today for evaluation / follow up of right \"hip \"pain. Patient is a 52-year-old female seen for evaluation for occasional hip pain. She really describes her pain is being more pelvic in nature rather than in the groin area. She really does not give any way of radicular symptoms. Patient was recently seen by Dr. Tutu Pomap for coccydynia and did have a injection into her sacrococcygeal joint. She is scheduled to have a sacrum MRI    . Review of Systems   Constitutional: Negative for fever, chills, sweats. Eyes: Negative for changes in vision, or pain. HENT: Negative for ear ache, epistaxis, or sore throat. Respiratory/Cardio: Negative for Chest pain, palpitations, SOB, or cough. Gastrointestinal: Negative for abdominal pain, N/V/D. Genitourinary: Negative for dysuria, frequency, urgency, or hematuria. Neurological: Negative for headache, numbness, or weakness. Integumentary: Negative for rash, itching, laceration, or abrasion. Musculoskeletal: Positive for New Patient (Left hip )       Physical Exam:  Constitutional: Patient is oriented to person, place, and time. Patient appears well-developed and well nourished. HENT: Negative otherwise noted  Head: Normocephalic and Atraumatic  Nose: Normal  Eyes: Conjunctivae and EOM are normal  Neck: Normal range of motion Neck supple. Respiratory/Cardio: Effort normal. No respiratory distress. Musculoskeletal: Physical examination the patient's right hip notes no trochanteric tenderness. She has very minimal if any pain on flexion internal ex rotation. She has a negative FADIR and a negative RUFINA ER negative Stinchfield's negative straight leg raise. .  Any pain she has more sacral iliac in location versus anteriorly. Neurological: Patient is alert and oriented to person, place, and time. Normal strenght. No sensory deficit. Skin: Skin is warm and dry  Psychiatric: Behavior is normal. Thought content normal.  Nursing note and vitals reviewed. Labs and Imaging:     XR taken on January 21 of this year show an AP lateral of patient's right hip. Patient does have well-maintained articular surface. She does have some a minor cam and pincer osteophyte but otherwise fairly benign. No orders of the defined types were placed in this encounter. Assessment and Plan:  Coccydynia possible SI joint involvement  Normal hip examination although patient has radiographic evidence for very mild femoral acetabular impingement      This is a 53 y.o. female who presents to the clinic today for evaluation / follow up of right hip pelvic pain.      Past History:    Current Outpatient Medications:     Fexofenadine HCl (ALLEGRA ALLERGY PO), Take by mouth, Disp: , Rfl:     vitamin C (ASCORBIC ACID) 500 MG tablet, Take 500 mg by mouth daily, Disp: , Rfl:     VITAMIN D PO, Take by mouth, Disp: , Rfl:     Glucosamine-MSM-Hyaluronic Acd (JOINT HEALTH PO), Take by mouth, Disp: , Rfl:     Doxylamine Succinate, Sleep, (UNISOM PO), Take by mouth, Disp: , Rfl:     ibuprofen (ADVIL;MOTRIN) 200 MG tablet, Take 200 mg by mouth every 6 hours as needed for Pain, Disp: , Rfl:   No Known Allergies  Social History     Socioeconomic History    Marital status:      Spouse name: Not on file    Number of children: Not on file  Years of education: Not on file    Highest education level: Not on file   Occupational History    Not on file   Social Needs    Financial resource strain: Not on file    Food insecurity     Worry: Not on file     Inability: Not on file    Transportation needs     Medical: Not on file     Non-medical: Not on file   Tobacco Use    Smoking status: Never Smoker    Smokeless tobacco: Never Used   Substance and Sexual Activity    Alcohol use: Yes     Alcohol/week: 0.0 standard drinks    Drug use: No    Sexual activity: Not on file   Lifestyle    Physical activity     Days per week: Not on file     Minutes per session: Not on file    Stress: Not on file   Relationships    Social connections     Talks on phone: Not on file     Gets together: Not on file     Attends Shinto service: Not on file     Active member of club or organization: Not on file     Attends meetings of clubs or organizations: Not on file     Relationship status: Not on file    Intimate partner violence     Fear of current or ex partner: Not on file     Emotionally abused: Not on file     Physically abused: Not on file     Forced sexual activity: Not on file   Other Topics Concern    Not on file   Social History Narrative    Not on file     No past medical history on file. Past Surgical History:   Procedure Laterality Date    HYSTERECTOMY, TOTAL ABDOMINAL      left ovarier      Family History   Problem Relation Age of Onset    Brain Cancer Father    Plan I advised the patient that I do not see anything significant on physical examination for her right hip. Despite her having some slight MILADYS on her x-rays she does not really exhibits the symptoms of this on examination. I recommended the patient return back to Dr. Scot Velazco after with her MRI of the sacrum. She does have an appointment for this.   She will be back on a as needed basis for me    Provider Attestation: Grazyna Dowd, personally performed the services described in this documentation. All medical record entries made by the scribe were at my direction and in my presence. I have reviewed the chart and discharge instructions and agree that the records reflect my personal performance and is accurate and complete. Kamini Pelletier MD. 01/28/21      Please note that this chart was generated using voice recognition Dragon dictation software. Although every effort was made to ensure the accuracy of this automated transcription, some errors in transcription may have occurred.

## 2021-02-04 ENCOUNTER — HOSPITAL ENCOUNTER (OUTPATIENT)
Dept: MRI IMAGING | Age: 51
Discharge: HOME | End: 2021-02-06
Payer: COMMERCIAL

## 2021-02-04 DIAGNOSIS — M53.3 COCCYXDYNIA: ICD-10-CM

## 2021-02-04 PROCEDURE — 72195 MRI PELVIS W/O DYE: CPT

## 2021-02-10 ENCOUNTER — PATIENT MESSAGE (OUTPATIENT)
Dept: ORTHOPEDIC SURGERY | Age: 51
End: 2021-02-10

## 2021-02-18 ENCOUNTER — OFFICE VISIT (OUTPATIENT)
Dept: PRIMARY CARE CLINIC | Age: 51
End: 2021-02-18

## 2021-02-18 ENCOUNTER — TELEPHONE (OUTPATIENT)
Dept: PRIMARY CARE CLINIC | Age: 51
End: 2021-02-18

## 2021-02-18 ENCOUNTER — TELEMEDICINE (OUTPATIENT)
Dept: PRIMARY CARE CLINIC | Age: 51
End: 2021-02-18
Payer: COMMERCIAL

## 2021-02-18 ENCOUNTER — HOSPITAL ENCOUNTER (OUTPATIENT)
Age: 51
Setting detail: SPECIMEN
Discharge: HOME | End: 2021-02-18
Payer: COMMERCIAL

## 2021-02-18 DIAGNOSIS — R05.9 COUGH: ICD-10-CM

## 2021-02-18 DIAGNOSIS — Z20.822 ENCOUNTER FOR LABORATORY TESTING FOR COVID-19 VIRUS: Primary | ICD-10-CM

## 2021-02-18 DIAGNOSIS — J06.9 VIRAL UPPER RESPIRATORY TRACT INFECTION: Primary | ICD-10-CM

## 2021-02-18 PROCEDURE — 99213 OFFICE O/P EST LOW 20 MIN: CPT | Performed by: NURSE PRACTITIONER

## 2021-02-18 ASSESSMENT — ENCOUNTER SYMPTOMS
ABDOMINAL PAIN: 0
WHEEZING: 0
VOMITING: 0
CHEST TIGHTNESS: 0
EYE ITCHING: 0
EYE REDNESS: 0
NAUSEA: 0
SINUS PRESSURE: 0
SHORTNESS OF BREATH: 0
SINUS PAIN: 0
SORE THROAT: 1
TROUBLE SWALLOWING: 0
DIARRHEA: 0
COUGH: 1
EYE DISCHARGE: 0

## 2021-02-18 NOTE — PROGRESS NOTES
2021    TELEHEALTH EVALUATION -- Audio/Visual (During ZVCZW-34 public health emergency)    HPI:    Jim Figueroa (:  1970) has requested an audio/video evaluation for the following concern(s):    Pt presents for a VV. Pt of itzel NP    Pt c/o a sore throat and cough. Started with a sore throat. She was taking medication. Now she is blowing her nose and she has a cough. She feels fatigued. Onset Monday. She works at [employer] in [employer]. Her co-worker may have [employer]. Cough is non productive. No shortness of breath  Nasal congestion  C/o chills yesterday (she is going through menopause)    She is taking figueredo brand cold medication with some relief. Concern for COVID. Review of Systems   Constitutional: Positive for chills and fatigue. Negative for fever. HENT: Positive for congestion and sore throat. Negative for ear discharge, ear pain, sinus pressure, sinus pain and trouble swallowing. Eyes: Negative for discharge, redness and itching. Respiratory: Positive for cough. Negative for chest tightness, shortness of breath and wheezing. Cardiovascular: Negative for chest pain. Gastrointestinal: Negative for abdominal pain, diarrhea, nausea and vomiting. Genitourinary: Negative for difficulty urinating. Musculoskeletal: Negative for arthralgias and neck pain. Skin: Negative for rash. Neurological: Negative for dizziness, weakness, light-headedness and headaches. All other systems reviewed and are negative. Prior to Visit Medications    Medication Sig Taking?  Authorizing Provider   Fexofenadine HCl (ALLEGRA ALLERGY PO) Take by mouth  Historical Provider, MD   vitamin C (ASCORBIC ACID) 500 MG tablet Take 500 mg by mouth daily  Historical Provider, MD   VITAMIN D PO Take by mouth  Historical Provider, MD   Glucosamine-MSM-Hyaluronic Acd ([facility]) Take by mouth  Historical Provider, MD Doxylamine Succinate, Sleep, (UNISOM PO) Take by mouth  Historical Provider, MD   ibuprofen (ADVIL;MOTRIN) 200 MG tablet Take 200 mg by mouth every 6 hours as needed for Pain  Historical Provider, MD       Social History     Tobacco Use    Smoking status: Never Smoker    Smokeless tobacco: Never Used   Substance Use Topics    Alcohol use: Yes     Alcohol/week: 0.0 standard drinks    Drug use: No        No Known Allergies, History reviewed. No pertinent past medical history. ,   Past Surgical History:   Procedure Laterality Date    HYSTERECTOMY, TOTAL ABDOMINAL      left ovarier    ,   Social History     Tobacco Use    Smoking status: Never Smoker    Smokeless tobacco: Never Used   Substance Use Topics    Alcohol use:  Yes     Alcohol/week: 0.0 standard drinks    Drug use: No   ,   Family History   Problem Relation Age of Onset    Brain Cancer Father    ,   Immunization History   Administered Date(s) Administered    Influenza Vaccine, unspecified formulation 10/05/2017    Influenza Virus Vaccine 01/21/2014, 10/07/2014, 10/08/2015, 10/05/2017, 10/18/2018    Influenza Whole 10/07/2014, 10/08/2015    Influenza, Quadv, IM, PF (6 mo and older Fluzone, Flulaval, Fluarix, and 3 yrs and older Afluria) 10/13/2020    Influenza, Quadv, Recombinant, IM PF (Flublok 18 yrs and older) 10/14/2019    Tdap (Boostrix, Adacel) 07/05/2018   ,   Health Maintenance   Topic Date Due    Hepatitis C screen  1970    Diabetes screen  12/05/2011    Lipid screen  08/28/2025    DTaP/Tdap/Td vaccine (2 - Td) 07/05/2028    Flu vaccine  Completed    HIV screen  Completed    Hepatitis A vaccine  Aged Out    Hepatitis B vaccine  Aged Out    Hib vaccine  Aged Out    Meningococcal (ACWY) vaccine  Aged Out    Pneumococcal 0-64 years Vaccine  Aged Out       PHYSICAL EXAMINATION:  [ INSTRUCTIONS:  \"[x]\" Indicates a positive item  \"[]\" Indicates a negative item  -- DELETE ALL ITEMS NOT EXAMINED] Vital Signs: (As obtained by patient/caregiver or practitioner observation)    Blood pressure-  Heart rate-    Respiratory rate-    Temperature-  Pulse oximetry-     Constitutional: [x] Appears well-developed and well-nourished [x] No apparent distress      [] Abnormal-   Mental status  [x] Alert and awake  [x] Oriented to person/place/time [x]Able to follow commands      Eyes:  EOM    [x]  Normal  [] Abnormal-  Sclera  []  Normal  [] Abnormal -         Discharge []  None visible  [] Abnormal -    HENT:   [x] Normocephalic, atraumatic. [] Abnormal   [x] Mouth/Throat: Mucous membranes are moist.     External Ears [x] Normal  [] Abnormal-     Neck: [x] No visualized mass     Pulmonary/Chest: [x] Respiratory effort normal.  [] No visualized signs of difficulty breathing or respiratory distress        [] Abnormal-      Musculoskeletal:   [] Normal gait with no signs of ataxia         [x] Normal range of motion of neck        [] Abnormal-       Neurological:        [x] No Facial Asymmetry (Cranial nerve 7 motor function) (limited exam to video visit)          [] No gaze palsy        [] Abnormal-         Skin:        [x] No significant exanthematous lesions or discoloration noted on facial skin         [] Abnormal-            Psychiatric:       [x] Normal Affect [] No Hallucinations        [] Abnormal-     Other pertinent observable physical exam findings-     ASSESSMENT/PLAN:  1. Viral upper respiratory tract infection  -Discussed symptomatic treatment with OTC cough and cold medications. Discussed nasal saline rinses and the use of flonase. Discussed throat lozenges, spray or warm salt water gargles for comfort. She is to drink plenty of fluids. 2. Cough  - COVID-19; Future  -she understands she must remain out of work until [employer]. If + she is to quarantine for 10 days from the start of her symptoms and must be fever free for 48 hours. Work note written. Pt agreeable to get covid tested. No follow-ups on file. Breanna Lara is a 53 y.o. female being evaluated by a Virtual Visit (video visit) encounter to address concerns as mentioned above. A caregiver was present when appropriate. Due to this being a TeleHealth encounter (During IVKOR-48 public health emergency), evaluation of the following organ systems was limited: Vitals/Constitutional/EENT/Resp/CV/GI//MS/Neuro/Skin/Heme-Lymph-Imm. Pursuant to the emergency declaration under the [address] [place] [address], [address] [place] [address] authority and the [employer] Act, this Virtual Visit was conducted with patient's (and/or legal guardian's) consent, to reduce the patient's risk of exposure to COVID-19 and provide necessary medical care. The patient (and/or legal guardian) has also been advised to contact this office for worsening conditions or problems, and seek emergency medical treatment and/or call 911 if deemed necessary. Patient identification was verified at the start of the visit: Yes    Total time spent on this encounter: Not billed by time    Services were provided through a video synchronous discussion virtually to substitute for in-person clinic visit. Patient and provider were located at their individual homes. --JOHN Koch CNP on 2/18/2021 at 3:15 PM    An electronic signature was used to authenticate this note.

## 2021-02-18 NOTE — PROGRESS NOTES
Pt presented with order from PCP for COVID-19 testing. Sample collected by Ryan Bradshaw MA and sent to the lab.

## 2021-02-18 NOTE — LETTER
NOTIFICATION RETURN TO WORK / SCHOOL    2/18/2021    Ms. Betsy Acevedo  [address] [facility] [address]      To Whom It May Concern:    Toney Kawasaki was tested for COVID-19 on 2/18, and the result was negative. She may return to work pending COVID results or after completing a quarantine period of 10 days with a return to work date of 2/26. I recommend:return without restrictions    If there are questions or concerns, please have the patient contact our office.         Sincerely,      Bety Olsen, APRN - CNP

## 2021-02-18 NOTE — TELEPHONE ENCOUNTER
Left message for patient that provider will be doing the note for work over again, to call if needed anything,.

## 2021-02-19 LAB
SARS-COV-2: NORMAL
SARS-COV-2: NOT DETECTED
SOURCE: NORMAL

## 2021-02-22 DIAGNOSIS — J32.9 SINUSITIS, UNSPECIFIED CHRONICITY, UNSPECIFIED LOCATION: Primary | ICD-10-CM

## 2021-02-22 RX ORDER — AZITHROMYCIN 250 MG/1
TABLET, FILM COATED ORAL
Qty: 6 TABLET | Refills: 0 | Status: SHIPPED | OUTPATIENT
Start: 2021-02-22 | End: 2021-03-04

## 2021-03-18 ENCOUNTER — OFFICE VISIT (OUTPATIENT)
Dept: ORTHOPEDIC SURGERY | Age: 51
End: 2021-03-18
Payer: COMMERCIAL

## 2021-03-18 DIAGNOSIS — M25.551 BILATERAL HIP PAIN: ICD-10-CM

## 2021-03-18 DIAGNOSIS — M53.3 COCCYXDYNIA: Primary | ICD-10-CM

## 2021-03-18 DIAGNOSIS — M25.552 BILATERAL HIP PAIN: ICD-10-CM

## 2021-03-18 PROCEDURE — 99213 OFFICE O/P EST LOW 20 MIN: CPT | Performed by: ORTHOPAEDIC SURGERY

## 2021-03-18 NOTE — PROGRESS NOTES
week: Not on file     Minutes per session: Not on file    Stress: Not on file   Relationships    Social connections     Talks on phone: Not on file     Gets together: Not on file     Attends Jainism service: Not on file     Active member of club or organization: Not on file     Attends meetings of clubs or organizations: Not on file     Relationship status: Not on file    Intimate partner violence     Fear of current or ex partner: Not on file     Emotionally abused: Not on file     Physically abused: Not on file     Forced sexual activity: Not on file   Other Topics Concern    Not on file   Social History Narrative    Not on file     No past medical history on file. Past Surgical History:   Procedure Laterality Date    HYSTERECTOMY, TOTAL ABDOMINAL      left ovarier      Family History   Problem Relation Age of Onset    Brain Cancer Father         Physical Exam:  Vitals signs and nursing note reviewed. Constitutional:       Appearance: She is well-developed. HENT:      Head: Normocephalic and atraumatic. Nose: Nose normal.   Eyes:      Conjunctiva/sclera: Conjunctivae normal.   Neck:      Musculoskeletal: Normal range of motion and neck supple. Pulmonary:      Effort: Pulmonary effort is normal. No respiratory distress. Musculoskeletal:      Comments: Normal gait     Skin:     General: Skin is warm and dry. Neurological:      Mental Status: She is alert and oriented to person, place, and time. Sensory: No sensory deficit. Psychiatric:         Behavior: Behavior normal.         Thought Content: Thought content normal.    MRI Sacrum Coccyx 2/4/21:  MRI sacrum and coccyx normal    Diagnostic x-rays bilateral hips normal    Assessment and Plan:  1. Coccyxdynia    2. Bilateral hip pain        Coccydynia significantly improved post injection    Patient is complaining of some vague hip pain      PT prescribed    Follow up 8 weeks    Provider Attestation:  Brennen Zurita, personally performed the services described in this documentation. All medical record entries made by the scribe were at my direction and in my presence. I have reviewed the chart and discharge instructions and agree that the records reflect my personal performance and is accurate and complete. Donna Brice MD 3/18/21     Scribe Attestation:  By signing my name below, Kaushik Roman, attest that this documentation has been prepared under the direction and in the presence of Dr. Jaelyn Damian. Electronically signed: Randolph Rosa, 3/18/21     Please note that this chart was generated using voice recognition Dragon dictation software. Although every effort was made to ensure the accuracy of this automated transcription, some errors in transcription may have occurred.

## 2021-03-25 ENCOUNTER — HOSPITAL ENCOUNTER (OUTPATIENT)
Dept: PHYSICAL THERAPY | Facility: CLINIC | Age: 51
Setting detail: THERAPIES SERIES
Discharge: HOME | End: 2021-03-25
Payer: COMMERCIAL

## 2021-03-25 PROCEDURE — 97161 PT EVAL LOW COMPLEX 20 MIN: CPT

## 2021-03-25 PROCEDURE — 97110 THERAPEUTIC EXERCISES: CPT

## 2021-03-25 PROCEDURE — G0283 ELEC STIM OTHER THAN WOUND: HCPCS

## 2021-03-25 NOTE — CONSULTS
[x] [facility]  Outpatient Rehabilitation &  Therapy  [address]  P: [phone]  F: [phone] [] [address]  P: [phone]  F: [phone] [] [address]  [facility]   [address]  P: [phone]  F: [phone]     Physical Therapy Pelvic Floor Evaluation    Date:  3/25/2021  Patient: Jr Villavicencio  : 1970  MRN: [id number]  Physician: Anika Cochran     Insurance: Eriberto NARANJOyuefreddy 150 (30 vs)  Medical Diagnosis: Coccysdynia, Bilateral hip pain    Rehab Codes: M53.3, M25.551, M25.552, M99.04  Onset Date: 3/18/21                                  Next 's appt:     Subjective:   CC:Pt is a 52 yo female who presents with complaints of lumbar and tail bone pain. Pt states pain has been on and off for years. No hx injury. Pt states she can not sit for long periods of time and has difficulty with all transitional movements. Also difficulty at work with lots of standing. All testing negative. Had previous injection and chiropractic without resolve. HPI: (onset date: 2020)     PMHx: []? Unremarkable        []? Diabetes     []? HTN                        []? Pacemaker              []? MI/Heart Problems []? Cancer       []? Arthritis       []? Other:              [x]? Refer to full medical chart  In EPIC         Comorbidities:   []? Obesity []? Dialysis  []? Other:   []? Asthma/COPD []? Dementia []? Other:   []? Stroke []? Sleep apnea []? Other:   []? Vascular disease []? Rheumatic disease []? Other:      Tests:  [x]? X-Ray: neg [x]? MRI:  neg                     []? Other:     Medications: [x]? Refer to full medical record            []? None          []? Other:  Allergies:      [x]? Refer to full medical record []? None          []? Other:     Function:  Hand Dominance  []? Right  []? Left  Employer [employer]   Job Status [x]? Normal duty   []?  Light []?      Neurological []?  []?  []?         Functional Test: Oswestry Score: 26.67% functionally impaired      Comments:Neg SLR, Neg KTC, Pos julio     Assessment:  Patient would benefit from skilled physical therapy services in order to: decrease pain, improve function, increase strength     Problems:    [x]? ? Pain:  []? ? ROM:  [x]? ? Strength:  [x]? ? Function:  []? Other:       STG: (to be met in 6 treatments)  1. ? Pain:3/10  2. Normal pelvic alignment  3. ? Strength: Improve core strength  4. ? Function: Able to perform all basic ADL's without pain/difficulty  5. Patient to be independent with home exercise program as demonstrated by performance with correct form without cues. LTG: (to be met in 12 treatments)  1. 0/10 pain  2. Oswestry at 0% disability     Patient goals: Get my pain under control     Rehab Potential:  [x]? Good  []? Fair  []? Poor              Suggested Professional Referral:  [x]? No  []? Yes:  Barriers to Goal Achievement:  [x]? No  []? Yes:  Domestic Concerns:  [x]? No  []? Yes:     Pt. Education:  [x]? Plans/Goals, Risks/Benefits discussed  [x]? Home exercise program  Method of Education: [x]? Verbal  []? Demo  [x]? Written (MET, piriformis stretches, hip flexor stretches)  Comprehension of Education:  [x]? Verbalizes understanding. [x]? Demonstrates understanding. []? Needs Review. []? Demonstrates/verbalizes understanding of HEP/Ed previously given.     Treatment Plan:  [x]? Therapeutic Exercise   44841             []? Iontophoresis: 4 mg/mL Dexamethasone Sodium Phosphate  mAmin  84598   []? Therapeutic Activity  22565 []? Vasopneumatic cold with compression  [id number]               []? Gait Training    47284 [x]? Ultrasound        [id number]   []? Neuromuscular Re-education  [id number] [x]? Electrical Stimulation Unattended  [id number]   [x]? Manual Therapy  07858 []? Electrical Stimulation Attended  [id number]   [x]? Instruction in HEP       []? Lumbar/Cervical Traction  [id number]   []?  Aquatic Therapy           [id number] [x]? Cold/hotpack     [x]? Massage   28523      [x]? Dry Needling, 1 or 2 muscles  07324   []? Biofeedback, first 15 minutes   29397  []? Biofeedback, additional 15 minutes   15097 []? Dry Needling, 3 or more muscles  68680      Frequency:  2 x/week for 12 visits     Todays Treatment:  Modalities:   Treatment Location  Left      Right                          Position   lumbar [x]? []?  [x]? Supine    []? Prone   []? Side lying  []? Sitting                                            Treatment Modality   1 Hot Pack:    [x]? Large   []? Medium    [x]? Cervical x2     _____ min     Cold Pack   []? Large   []? Medium    []? Cervical     _____ min     Vasocompression    __° temp    ____pressure     _____ min   1 Electrical Stim:    []? IFC     []? [address]      [x]? [facility]                          Protocol:____analgesic_____X___20__min                          #Channels:  []? 1        [x]? 2       []? Other:   PRN Ultrasound: _1.5_____W/cm2 x  __10____min              [x]? 1MHz   []? 3Mhz                      Duty Factor: [x]? 100%  []? 50%   []? 20%                  Add: []? Lidex   []? Stim    []? Gel pad        Massage:    []? Soft Tissue   []? Cross Friction                      []? Ice ____min     Traction:   []? Prone   []? Supine   X _______min               []? Lumbar x ____lbs      []? Cervical x _____lbs               []? Continuous     []? Intermittent  -   On:_____x Off:_____   2 Other:  MET, stretches, manual, ex                             Precautions:   Exercises:  Exercise Reps/ Time Weight/ Level Comments   MET 10 5 sec     Supine PRC ex 20x       Piriformis stretch 10x 10 sec      Hip Flexor stretches  10x 10 sec               Other:     Specific Instructions for next treatment:Recheck pelvic alignment, Cont with modalities and stretches/ex as indicated. Add diaphragmatic breathing ex.     Evaluation Complexity:  History (Personal factors, comorbidities) [x]? 0 []? 1-2 []?  3+ Exam (limitations, restrictions) [x]? 1-2 []? 3 []? 4+   Clinical presentation (progression) [x]? Stable []? Evolving  []? Unstable   Decision Making [x]? Low []? Moderate []? High     [x]? Low Complexity []? Moderate Complexity []? High Complexity         Treatment Charges: Mins Units   [x]? Evaluation       [x]? Low       []? Moderate       []? High 20 1   [x]? Modalities: HP/ES 20/20 0/1   Us     [x]? Ther Exercise 15 1   []? Manual Therapy       []? Ther Activities       []? Aquatics       []? Vasocompression       []?   Other          TOTAL TREATMENT TIME: 55        Time in: 0800    Time out:0900     Electronically signed by: Yao Vallejo PT     Physician Signature:________________________________Date:__________________  By signing above or cosigning this note, I have reviewed this plan of care and certify a need for medically necessary rehabilitation services.      *PLEASE SIGN ABOVE AND FAX BACK ALL PAGES*

## 2021-04-01 ENCOUNTER — HOSPITAL ENCOUNTER (OUTPATIENT)
Dept: PHYSICAL THERAPY | Facility: CLINIC | Age: 51
Setting detail: THERAPIES SERIES
Discharge: HOME | End: 2021-04-01
Payer: COMMERCIAL

## 2021-04-01 PROCEDURE — G0283 ELEC STIM OTHER THAN WOUND: HCPCS

## 2021-04-01 PROCEDURE — 97110 THERAPEUTIC EXERCISES: CPT

## 2021-04-01 NOTE — FLOWSHEET NOTE
[] [facility]) [facility] &  Therapy  [address]  [phone]  F: [phone] [] [address]  [email or web address]   [address]  P: [phone]  F: [phone] [x] [address] &  Therapy  [address]  P: [phone]  F: [phone] [] [email or web address]  P: [phone]  F: [phone] [] [address]  [facility]   [address]   [place]: [phone]  F: [phone]      Physical Therapy Daily Treatment Note    Date:  2021  Patient Name:  Rivera Frazier    :  1970  MRN: [id number]  Physician: Jesse Severin                                 Insurance: Yue Pillai [facility] (30 vs)  Medical Diagnosis: Coccysdynia, Bilateral hip pain                          Rehab Codes: M53.3, M25.551, M25.552, M99.04  Onset Date: 3/18/21                                  Next 's appt:           Visit# / total visits: ; Progress note for Medicare patient due at visit      Cancels/No Shows: 0/0    Subjective:    Pain:  [x] Yes  [] No Location: Lumbar/SIJ/coccyx  Pain Rating: (0-10 scale) 5/10  Pain altered Tx:  [x] No  [] Yes  Action:  Comments: Patient reported some improvement after last session    Objective:  Precautions: Todays Treatment:    Modalities:   Treatment Location  Left      Right                          [id number]   lumbar [x]? ?          []? ?  [x]? ? Supine    []? ? Prone   []? ? Side lying  []? ? Sitting                                            Treatment Modality   1 Hot Pack:    [x]? ? Large   []? ? Medium    [x]? ? Cervical x2     _____ min     Cold Pack   []? ? Large   []? ? Medium    []? ? Cervical     _____ min     Vasocompression    __° temp    ____pressure     _____ min   1 Electrical Stim:    []? ? IFC     []? ?[address]      [x]? ?[address]                          Protocol:____analgesic_____X___20__min                          #Channels:  []?? 1        [x]? ? 2       []? ? Other:   PRN Ultrasound: _1.5_____W/cm2 x  __10____min              [x]? ? 1MHz   []? ? 3Mhz                      Duty Factor: [x]? ? 100%  []?? 50%   []? ? 20%                  Add: []?? Lidex   []? ? Stim    []? ? Gel pad        Massage:    []? ? Soft Tissue   []? ? Cross Friction                      []? ? Ice ____min     Traction:   []? ? Prone   []? ? Supine   X _______min               []? ? Lumbar x ____lbs      []? ? Cervical x _____lbs               []? ? Continuous     []? ? Intermittent  -   On:_____x Off:_____   2 Other:  MET, stretches, manual, ex                             Precautions:   Exercises:  Exercise Reps/ Time Weight/ Level Comments   MET 10 5 sec     Supine PRC ex 20x       Piriformis stretch 10x 10 sec      Hip Flexor stretches  10x 10 sec               Other:      Treatment Charges: Mins Units   [x]  Modalities:Estim/HP 20/0 1/0   [x]  Ther Exercise 30 2   []  Manual Therapy     []  Ther Activities     []  Aquatics     []  Vasocompression     []  Other     Total Treatment time 50 3       Assessment: [x] Progressing toward goals. Initiated session with Estim and HP followed by recheck of pelvis followed by MET and stretches. Patient reported slight improvement of pain after session. Next session add diaphragmatic breathing exercises. [] No change. [] Other:  [x] Patient would continue to benefit from skilled physical therapy services in order to: decrease pain, improve function, increase strength    Problems:    [x]? ? ? Pain:  []?? ? ROM:  [x]? ? ? Strength:  [x]? ? ? Function:  []?? Other:       STG: (to be met in 6 treatments)  1. ? Pain:3/10  2. Normal pelvic alignment  3. ? Strength: Improve core strength  4. ? Function: Able to perform all basic ADL's without pain/difficulty  5.  Patient to be independent with home exercise program as

## 2021-04-06 ENCOUNTER — HOSPITAL ENCOUNTER (OUTPATIENT)
Dept: PHYSICAL THERAPY | Facility: CLINIC | Age: 51
Setting detail: THERAPIES SERIES
Discharge: HOME | End: 2021-04-06
Payer: COMMERCIAL

## 2021-04-06 PROCEDURE — 97110 THERAPEUTIC EXERCISES: CPT

## 2021-04-06 PROCEDURE — G0283 ELEC STIM OTHER THAN WOUND: HCPCS

## 2021-04-06 NOTE — FLOWSHEET NOTE
[] [facility]) [facility] &  Therapy  [address]  [phone]  F: [phone] [] [address]  [employer] [address]   [address]  P: [phone]  F: [phone] [x] [address] &  Therapy  [address] [facility]  P: [phone]  F: [phone] [] [address]  P: [phone]  F: [phone] [] [address]  [facility]   [address]   [place]: [phone]  F: [phone]      Physical Therapy Daily Treatment Note    Date:  2021  Patient Name:  Tulio Lopez    :  1970  MRN: [id number]  Physician: Horacio Bergman                                 Insurance: Elmarie Fruit (30 vs)  Medical Diagnosis: Coccysdynia, Bilateral hip pain                          Rehab Codes: M53.3, M25.551, M25.552, M99.04  Onset Date: 3/18/21                                  Next 's appt:           Visit# / total visits: 3/12; Progress note for Medicare patient due at visit      Cancels/No Shows: 0/0    Subjective:    Pain:  [x] Yes  [] No Location: Lumbar/SIJ/coccyx  Pain Rating: (0-10 scale) 4/10  Pain altered Tx:  [x] No  [] Yes  Action:  Comments: Pt reports she was really sore this weekend after being on her feet all day at work Saturday. Went home and did her stretches which she said made her feel a lot better. Objective:  Precautions: Todays Treatment:    Modalities:   Treatment Location  Left      Right                          OUQUSMUF   lumbar [x]? ?          []? ?  [x]? ? Supine    []? ? Prone   []? ? Side lying  []? ? Sitting                                            Treatment Modality   1 Hot Pack:    [x]? ? Large   []? ? Medium    [x]? ? Cervical x2     _____ min     Cold Pack   []? ? Large   []? ? Medium    []? ? Cervical     _____ min     Vasocompression    __°    [x]?? ? Pain:  []?? ? ROM:  [x]? ? ? Strength:  [x]? ? ? Function:  []?? Other:       STG: (to be met in 6 treatments)  1. ? Pain:3/10  2. Normal pelvic alignment  3. ? Strength: Improve core strength  4. ? Function: Able to perform all basic ADL's without pain/difficulty  5. Patient to be independent with home exercise program as demonstrated by performance with correct form without cues. LTG: (to be met in 12 treatments)  1. 0/10 pain  2. Oswestry at 0% disability     Patient goals: Get my pain under control     Pt. Education:  [x] Yes  [] No  [] Reviewed Prior HEP/Ed  Method of Education: [x] Verbal  [] Demo  [] Written  Comprehension of Education:  [x] Verbalizes understanding. [] Demonstrates understanding. [] Needs review. [] Demonstrates/verbalizes HEP/Ed previously given. Plan: [x] Continue current frequency toward long and short term goals.     [x] Specific Instructions for subsequent treatments:  Recheck pelvic alignment, Cont with modalities and stretches/ex as indicated, progress core ex as technique of TA activation improves      Time In: 2:02 PM            Time Out: 2:58 pm    Electronically signed by:  Oleksandr Castillo PTA

## 2021-04-15 ENCOUNTER — HOSPITAL ENCOUNTER (OUTPATIENT)
Dept: PHYSICAL THERAPY | Facility: CLINIC | Age: 51
Setting detail: THERAPIES SERIES
Discharge: HOME | End: 2021-04-15
Payer: COMMERCIAL

## 2021-04-15 PROCEDURE — G0283 ELEC STIM OTHER THAN WOUND: HCPCS

## 2021-04-15 PROCEDURE — 97110 THERAPEUTIC EXERCISES: CPT

## 2021-04-15 NOTE — FLOWSHEET NOTE
[] [facility]) - [facility] &  Therapy  [address]  [phone]  F: [phone] [] [address]  [address]   [address]  P: [phone]  F: [phone] [x] [address] &  Therapy  [address]  P: [phone]  F: [phone] [] [address]  P: [phone]  F: [phone] [] [address]  [facility]   [address]   [place]: [phone]  F: [phone]      Physical Therapy Daily Treatment Note    Date:  4/15/2021  Patient Name:  Montana Rubio    :  1970  MRN: [id number]  Physician: Elvin Fry                                 Insurance: [employer] (30 vs)  Medical Diagnosis: Coccysdynia, Bilateral hip pain                          Rehab Codes: M53.3, M25.551, M25.552, M99.04  Onset Date: 3/18/21                                  Next 's appt:           Visit# / total visits: ; Progress note for Medicare patient due at visit      Cancels/No Shows: 0/0    Subjective:    Pain:  [x] Yes  [] No Location: Lumbar/SIJ/coccyx  Pain Rating: (0-10 scale) 3/10  Pain altered Tx:  [x] No  [] Yes  Action:  Comments: Pt arrives stating she has noticed improvement with pain and symptoms however had a busy week and was unable to perform HEP. Objective:  Precautions: Todays Treatment:    Modalities:   Treatment Location  Left      Right                          [id number]   lumbar [x]? ?          []? ?  [x]? ? Supine    []? ? Prone   []? ? Side lying  []? ? Sitting                                            Treatment Modality   1 Hot Pack:    [x]? ? Large   []? ? Medium    [x]? ? Cervical x2     _____ min     Cold Pack   []? ? Large   []? ? Medium    []? ? Cervical     _____ min     Vasocompression    __° temp    ____pressure     _____ min   1 Electrical Stim:    []? ? IFC     []? ? MFAC      [x]? ?[address]                          Protocol:____analgesic_____X___20__min                          #Channels:  []?? 1        [x]? ? 2       []? ? Other:   PRN Ultrasound: _1.5_____W/cm2 x  __10____min              [x]? ? 1MHz   []? ? 3Mhz                      Duty Factor: [x]? ? 100%  []?? 50%   []? ? 20%                  Add: []?? Lidex   []? ? Stim    []? ? Gel pad        Massage:    []? ? Soft Tissue   []? ? Cross Friction                      []? ? Ice ____min     Traction:   []? ? Prone   []? ? Supine   X _______min               []? ? Lumbar x ____lbs      []? ? Cervical x _____lbs               []? ? Continuous     []? ? Intermittent  -   On:_____x Off:_____   2 Other:  MET, stretches, manual, ex                             Precautions:   Exercises:  Exercise Reps/ Time Weight/ Level Comments   MET 10 5 sec     Supine PRC ex 20x       Piriformis stretch 10x 10 sec     Hip Flexor stretches  10x 10 sec     Diaphragmatic Breathing   10x       TA activation 10x5\"  Max cues for technique                Other:      Treatment Charges: Mins Units   [x]  Modalities:Estim/HP 20/0 1/0   [x]  Ther Exercise 30 2   []  Manual Therapy     []  Ther Activities     []  Aquatics     []  Vasocompression     []  Other     Total Treatment time 50 3       Assessment: [x] Progressing toward goals. Began session with MHP/ES to promote muscle relaxation and flexibility. Continued with MET for alignment correction followed by therex program with verbal cues required on technique and recall. Pt demonstrated improved ability with completion of TA activation this date. Encouraged compliance with HEP in between sessions to assist with progression. Will continue to progress as tolerated. [] No change.      [] Other:  [x] Patient would continue to benefit from skilled physical therapy services in order to: decrease pain, improve function, increase strength    Problems:    [x]?? ? Pain:  []?? ? ROM:  [x]? ? ? Strength:  [x]? ? ? Function:  []?? Other:       STG: (to be met in 6 treatments)  1. ? Pain:3/10  2. Normal pelvic alignment  3. ? Strength: Improve core strength  4. ? Function: Able to perform all basic ADL's without pain/difficulty  5. Patient to be independent with home exercise program as demonstrated by performance with correct form without cues. LTG: (to be met in 12 treatments)  1. 0/10 pain  2. Oswestry at 0% disability     Patient goals: Get my pain under control     Pt. Education:  [x] Yes  [] No  [] Reviewed Prior HEP/Ed  Method of Education: [x] Verbal  [] Demo  [] Written  Comprehension of Education:  [x] Verbalizes understanding. [] Demonstrates understanding. [] Needs review. [] Demonstrates/verbalizes HEP/Ed previously given. Plan: [x] Continue current frequency toward long and short term goals. [x] Specific Instructions for subsequent treatments:  Recheck pelvic alignment, Cont with modalities and stretches/ex as indicated, progress core ex as technique of TA activation improves      Time In: 1:57 PM            Time Out: 2: 55 pm    Electronically signed by:   Lillie Davis, [place]

## 2021-04-22 ENCOUNTER — HOSPITAL ENCOUNTER (OUTPATIENT)
Dept: PHYSICAL THERAPY | Facility: CLINIC | Age: 51
Setting detail: THERAPIES SERIES
Discharge: HOME | End: 2021-04-22
Payer: COMMERCIAL

## 2021-04-22 PROCEDURE — 97110 THERAPEUTIC EXERCISES: CPT

## 2021-04-22 PROCEDURE — G0283 ELEC STIM OTHER THAN WOUND: HCPCS

## 2021-04-22 NOTE — FLOWSHEET NOTE
[] [facility]) - [facility] &  Therapy  [address]  [phone]  F: [phone] [] [address]  [address]   [address]  P: [phone]  F: [phone] [x] [address] &  Therapy  [address]  P: [phone]  F: [phone] [] [address]  P: [phone]  F: [phone] [] [address]  [facility]   [address]   [place]: [phone]  F: [phone]      Physical Therapy Daily Treatment Note    Date:  2021  Patient Name:  Demarcus Toscano    :  1970  MRN: [id number]  Physician: Alka Jean-Baptiste                                 Insurance: [employer] (30 vs)  Medical Diagnosis: Coccysdynia, Bilateral hip pain                          Rehab Codes: M53.3, M25.551, M25.552, M99.04  Onset Date: 3/18/21                                  Next 's appt:           Visit# / total visits: ; Progress note for Medicare patient due at visit      Cancels/No Shows: 0/0    Subjective:    Pain:  [x] Yes  [] No Location: Lumbar/SIJ/coccyx  Pain Rating: (0-10 scale) 5/10  Pain altered Tx:  [x] No  [] Yes  Action:  Comments: Pt arrives stating she had excruciating L hip pain on Tuesday while working which caused her to limp. Pt states pain gradually worked itself out although she reports she hasn't been very compliant with HEP. No reoccurrence of symptoms verbalized. Objective:  Precautions: Todays Treatment:    Modalities:   Treatment Location  Left      Right                          [id number]   lumbar [x]? ?          []? ?  [x]? ? Supine    []? ? Prone   []? ? Side lying  []? ? Sitting                                            Treatment Modality   1 Hot Pack:    [x]? ? Large   []? ? Medium    [x]? ? Cervical x2     _____ min     Cold progress as able. [] No change. [] Other:  [x] Patient would continue to benefit from skilled physical therapy services in order to: decrease pain, improve function, increase strength    Problems:    [x]? ? ? Pain:  []?? ? ROM:  [x]? ? ? Strength:  [x]? ? ? Function:  []?? Other:       STG: (to be met in 6 treatments)  1. ? Pain:3/10  2. Normal pelvic alignment  3. ? Strength: Improve core strength  4. ? Function: Able to perform all basic ADL's without pain/difficulty  5. Patient to be independent with home exercise program as demonstrated by performance with correct form without cues. LTG: (to be met in 12 treatments)  1. 0/10 pain  2. Oswestry at 0% disability     Patient goals: Get my pain under control     Pt. Education:  [x] Yes  [] No  [] Reviewed Prior HEP/Ed  Method of Education: [x] Verbal  [] Demo  [] Written  Comprehension of Education:  [x] Verbalizes understanding. [] Demonstrates understanding. [] Needs review. [] Demonstrates/verbalizes HEP/Ed previously given. Plan: [x] Continue current frequency toward long and short term goals. [x] Specific Instructions for subsequent treatments:  Recheck pelvic alignment, Cont with modalities and stretches/ex as indicated, progress core ex as technique of TA activation improves      Time In: 9:00 am            Time Out: 9:51 am    Electronically signed by:   Jennifer Orona

## 2021-04-29 ENCOUNTER — HOSPITAL ENCOUNTER (OUTPATIENT)
Dept: PHYSICAL THERAPY | Facility: CLINIC | Age: 51
Setting detail: THERAPIES SERIES
Discharge: HOME | End: 2021-04-29
Payer: COMMERCIAL

## 2021-04-29 PROCEDURE — G0283 ELEC STIM OTHER THAN WOUND: HCPCS

## 2021-04-29 PROCEDURE — 97110 THERAPEUTIC EXERCISES: CPT

## 2021-04-29 NOTE — FLOWSHEET NOTE
[] [facility]) [facility] &  Therapy  [address]  [phone]  F: [phone] [] [address]  [employer] [address]   [address]  P: [phone]  F: [phone] [x] [address] &  Therapy  [address]  P: [phone]  F: [phone] [] [address]  P: [phone]  F: [phone] [] [address]  [facility]   [address]   [place]: [phone]  F: [phone]      Physical Therapy Daily Treatment Note    Date:  2021  Patient Name:  Monae Ponce    :  1970  MRN: [id number]  [facility]                                 Insurance: Neyda Steven (30 vs)  Medical Diagnosis: Coccysdynia, Bilateral hip pain                          Rehab Codes: M53.3, M25.551, M25.552, M99.04  Onset Date: 3/18/21                                  Next 's appt:           Visit# / total visits:     Cancels/No Shows: 0/0    Subjective:    Pain:  [x] Yes  [] No Location: Lumbar/SIJ/coccyx  Pain Rating: (0-10 scale) 2/10  Pain altered Tx:  [x] No  [] Yes  Action:  Comments: Pt arrives stating tailbone is painful from sitting for hours while getting hair done. Objective:  Precautions: Todays Treatment:    Modalities:   Treatment Location  Left      Right                          CRISAHVG   lumbar [x]? ?          []? ?  [x]? ? Supine    []? ? Prone   []? ? Side lying  []? ? Sitting                                            Treatment Modality   1 Hot Pack:    [x]? ? Large   []? ? Medium    [x]? ? Cervical x2     _____ min     Cold Pack   []? ? Large   []? ? Medium    []? ? Cervical     _____ min     Vasocompression    __° temp    ____pressure     _____ min   1 Electrical Stim:    []? ? IFC     []? ?[address]      [x]? ?[address]                          Protocol:____analgesic_____X___20__min                          #Channels:  []?? 1        [x]? ? 2       []? ? Other:   PRN Ultrasound: _1.5_____W/cm2 x  __10____min              [x]? ? 1MHz   []? ? 3Mhz                      Duty Factor: [x]? ? 100%  []?? 50%   []? ? 20%                  Add: []?? Lidex   []? ? Stim    []? ? Gel pad        Massage:    []? ? Soft Tissue   []? ? Cross Friction                      []? ? Ice ____min     Traction:   []? ? Prone   []? ? Supine   X _______min               []? ? Lumbar x ____lbs      []? ? Cervical x _____lbs               []? ? Continuous     []? ? Intermittent  -   On:_____x Off:_____   2 Other:  MET, stretches, manual, ex                             Precautions:   Exercises:  Exercise Reps/ Time Weight/ Level Comments   MET 10 5 sec  PRN   Supine PRC ex 20x    PRN   Piriformis stretch 10x 10 sec     Hip Flexor stretches  10x 10 sec     Diaphragmatic Breathing   10x       TA activation 10x5\"  Max cues for technique    TA act w/ Marches  2x10     TA sets with Alt UE/LE 2x10     Other:      Treatment Charges: Mins Units   [x]  Modalities:Estim/HP 20/0 1/0   [x]  Ther Exercise 30 2   []  Manual Therapy     []  Ther Activities     []  Aquatics     []  Vasocompression     []  Other     Total Treatment time 50 3       Assessment: [x] Progressing toward goals. Initiated session with MHP/ES to alleviate pain and promote flexibility. Pt presents within alignment this date with no correction required. Cont with stretches and therex for strengthening and increased function. Educated pt on utilizing foam cushion when sitting for long periods of time and compliance with HEP. Good technique noted with breathing and core activation this date. [] No change.      [] Other:  [x] Patient would continue to benefit from skilled physical therapy services in order to: decrease pain, improve function, increase strength    Problems:    [x]?? ? Pain:  []?? ? ROM:  [x]? ? ? Strength:  [x]? ? ? Function:  []?? Other:       STG: (to be met in 6 treatments)  1. ? Pain:3/10  2. Normal pelvic alignment  3. ? Strength: Improve core strength  4. ? Function: Able to perform all basic ADL's without pain/difficulty  5. Patient to be independent with home exercise program as demonstrated by performance with correct form without cues. LTG: (to be met in 12 treatments)  1. 0/10 pain  2. Oswestry at 0% disability     Patient goals: Get my pain under control     Pt. Education:  [x] Yes  [] No  [] Reviewed Prior HEP/Ed  Method of Education: [x] Verbal  [] Demo  [] Written  Comprehension of Education:  [x] Verbalizes understanding. [] Demonstrates understanding. [] Needs review. [] Demonstrates/verbalizes HEP/Ed previously given. Plan: [x] Continue current frequency toward long and short term goals. [x] Specific Instructions for subsequent treatments:  Recheck pelvic alignment, Cont with modalities and stretches/ex as indicated, progress core ex as technique of TA activation improves      Time In: 4:05 pm          Time Out: 4:55 pm    Electronically signed by:   Jennifer Mobley

## 2021-05-06 ENCOUNTER — HOSPITAL ENCOUNTER (OUTPATIENT)
Dept: PHYSICAL THERAPY | Facility: CLINIC | Age: 51
Setting detail: THERAPIES SERIES
Discharge: HOME | End: 2021-05-06
Payer: COMMERCIAL

## 2021-05-06 PROCEDURE — 97110 THERAPEUTIC EXERCISES: CPT

## 2021-05-06 PROCEDURE — G0283 ELEC STIM OTHER THAN WOUND: HCPCS

## 2021-05-06 NOTE — FLOWSHEET NOTE
[] [facility] TWELVESTEP [facility] &  Therapy  [address]  [phone]  F: [phone] [] [address]  [facility]   [address]  P: [phone]  F: [phone] [x] [address]  Outpatient Rehabilitation &  Therapy  [address] [facility]  P: [phone]  F: [phone] [] [address]  P: [phone]  F: [phone] [] [address] [facility]   [address]   [place]: [phone]  F: [phone]      Physical Therapy Daily Treatment Note    Date:  2021  Patient Name:  Zak De Leon    :  1970  MRN: [id number]  Physician: Jaja Valenzuela                                 Insurance: [employer] (30 vs)  Medical Diagnosis: Coccysdynia, Bilateral hip pain                          Rehab Codes: M53.3, M25.551, M25.552, M99.04  Onset Date: 3/18/21                                  Next 's appt:           Visit# / total visits:     Cancels/No Shows: 0/0    Subjective:    Pain:  [x] Yes  [] No Location: Lumbar/SIJ/coccyx  Pain Rating: (0-10 scale) 4/10  Pain altered Tx:  [x] No  [] Yes  Action:  Comments: Pt reports feeling very tired today since working a lot this week. Overall feels 50% better since starting PT. Notes some of her pain she thinks she will have forever. Objective:  Precautions: Todays Treatment:    Modalities:   Treatment Location  Left      Right                          [id number]   lumbar [x]? ?          []? ?  [x]? ? Supine    []? ? Prone   []? ? Side lying  []? ? Sitting                                            Treatment Modality   1 Hot Pack:    [x]? ? Large   []? ? Medium    [x]? ? Cervical x2     _____ min     Cold Pack   []? ? Large   []? ? Medium    []? ? Cervical     _____ min     Vasocompression    __° temp    ____pressure     _____ min   1 Electrical Stim:    []? ? IFC     []? ? MFAC      [x]? ?[address]                          Protocol:____analgesic_____X___20__min                          #Channels:  []?? 1        [x]? ? 2       []? ? Other:   PRN Ultrasound: _1.5_____W/cm2 x  __10____min              [x]? ? 1MHz   []? ? 3Mhz                      Duty Factor: [x]? ? 100%  []?? 50%   []? ? 20%                  Add: []?? Lidex   []? ? Stim    []? ? Gel pad        Massage:    []? ? Soft Tissue   []? ? Cross Friction                      []? ? Ice ____min     Traction:   []? ? Prone   []? ? Supine   X _______min               []? ? Lumbar x ____lbs      []? ? Cervical x _____lbs               []? ? Continuous     []? ? Intermittent  -   On:_____x Off:_____   2 Other:  MET, stretches, manual, ex                             Precautions:   Exercises:  Exercise Reps/ Time Weight/ Level Comments   MET 10 5 sec  PRN   Supine PRC ex 20x    PRN   Piriformis stretch 10x 10 sec     Hip Flexor stretches  10x 10 sec     Diaphragmatic Breathing   10x       TA activation 10x5\"  Max cues for technique    TA act w/ Marches  2x10  Held today d/t poor technique    TA sets with Alt UE 2x10     Bridges x10     Other:      Treatment Charges: Mins Units   [x]  Modalities:Estim/HP 20/0 1/0   [x]  Ther Exercise 30 2   []  Manual Therapy     []  Ther Activities     []  Aquatics     []  Vasocompression     []  Other     Total Treatment time 50 3       Assessment: [x] Progressing toward goals. Cont to initiate session with MHP/ES to promote pain relief and muscle relaxation. Cont with stretches/ex per log above with fair tolerance. Max cues needed for technique of TA activation, fair follow through. Added bridges to continue with hip/core strength. Emphasized importance of HEP. [] No change.      [] Other:  [x] Patient would continue to benefit from skilled physical therapy services in order to: decrease pain, improve function, increase strength    Problems:    [x]?? ? Pain:  []?? ? ROM:  [x]? ? ? Strength:  [x]? ? ? Function:  []?? Other:       STG: (to be met in 6 treatments)  1. ? Pain:3/10  2. Normal pelvic alignment  3. ? Strength: Improve core strength  4. ? Function: Able to perform all basic ADL's without pain/difficulty  5. Patient to be independent with home exercise program as demonstrated by performance with correct form without cues. LTG: (to be met in 12 treatments)  1. 0/10 pain  2. Oswestry at 0% disability     Patient goals: Get my pain under control     Pt. Education:  [x] Yes  [] No  [] Reviewed Prior HEP/Ed  Method of Education: [x] Verbal  [] Demo  [] Written  Comprehension of Education:  [x] Verbalizes understanding. [] Demonstrates understanding. [] Needs review. [] Demonstrates/verbalizes HEP/Ed previously given. Plan: [x] Continue current frequency toward long and short term goals.     [x] Specific Instructions for subsequent treatments:  Recheck pelvic alignment, Cont with modalities and stretches/ex as indicated, progress core ex as technique of TA activation improves      Time In: 5:05 pm          Time Out: 6:00 pm    Electronically signed by:  Jena Pimentel PTA

## 2021-05-14 ENCOUNTER — HOSPITAL ENCOUNTER (OUTPATIENT)
Dept: PHYSICAL THERAPY | Facility: CLINIC | Age: 51
Setting detail: THERAPIES SERIES
Discharge: HOME | End: 2021-05-14
Payer: COMMERCIAL

## 2021-05-14 PROCEDURE — 97110 THERAPEUTIC EXERCISES: CPT

## 2021-05-14 PROCEDURE — G0283 ELEC STIM OTHER THAN WOUND: HCPCS

## 2021-05-14 NOTE — FLOWSHEET NOTE
[] [facility] TWELVESTEP [facility] &  Therapy  [address]  [phone]  F: [phone] [] [address]  [facility]   [address]  P: [phone]  F: [phone] [x] [address]  Outpatient Rehabilitation &  Therapy  [address]  P: [phone]  F: [phone] [] [address]  P: [phone]  F: [phone] [] [address]  [facility]   [address]   [place]: [phone]  F: [phone]      Physical Therapy Daily Treatment Note    Date:  2021  Patient Name:  Nona Salmon    :  1970  MRN: [id number]  Physician: Oriana Judge                                 Insurance: [employer] (30 vs)  Medical Diagnosis: Coccysdynia, Bilateral hip pain                          Rehab Codes: M53.3, M25.551, M25.552, M99.04  Onset Date: 3/18/21                                  Next 's appt:           Visit# / total visits:     Cancels/No Shows: 0/0    Subjective:    Pain:  [x] Yes  [] No Location: Lumbar/SIJ/coccyx  Pain Rating: (0-10 scale) 3/10  Pain altered Tx:  [x] No  [] Yes  Action:  Comments: Pt reports less pain overall but working long shifts and feels sore and exhausted after. Pt notes not very compliant with HEP due to feeling so tired after work. Pt notes occ pain into PF area over the last week. Objective:  Precautions: Todays Treatment:    Modalities:   Treatment Location  Left      Right                          [id number]   lumbar [x]? ?          []? ?  [x]? ? Supine    []? ? Prone   []? ? Side lying  []? ? Sitting                                            Treatment Modality   1 Hot Pack:    [x]? ? Large   []? ? Medium    [x]? ? Cervical x2     _____ min     Cold Pack   []? ? Large   []? ? Medium    []? ? Cervical     _____ min     Vasocompression    __° temp    ____pressure     _____ min   1 Electrical Stim:    []? ? IFC     []? ? MFAC      [x]? ?[address]                          Protocol:____analgesic_____X___20__min                          #Channels:  []?? 1        [x]? ? 2       []? ? Other:   PRN Ultrasound: _1.5_____W/cm2 x  __10____min              [x]? ? 1MHz   []? ? 3Mhz                      Duty Factor: [x]? ? 100%  []?? 50%   []? ? 20%                  Add: []?? Lidex   []? ? Stim    []? ? Gel pad        Massage:    []? ? Soft Tissue   []? ? Cross Friction                      []? ? Ice ____min     Traction:   []? ? Prone   []? ? Supine   X _______min               []? ? Lumbar x ____lbs      []? ? Cervical x _____lbs               []? ? Continuous     []? ? Intermittent  -   On:_____x Off:_____   2 Other:  MET, stretches, manual, ex                             Precautions:   Exercises:  Exercise Reps/ Time Weight/ Level Comments   MET 10 5 sec  PRN   Supine PRC ex 20x       Piriformis stretch 5x 10 sec     Hip Flexor stretches 5x 10 sec     Diaphragmatic Breathing   10x       TA activation 10x5\"   cues for technique    TA act w/ Marches  2x10     TA sets with Alt UE 2x10     Bridges x10     Clamshells Next*     Other:      Treatment Charges: Mins Units   [x]  Modalities:Estim/HP 20/0 1/0   [x]  Ther Exercise 20 1   []  Manual Therapy     []  Ther Activities     []  Aquatics     []  Vasocompression     []  Other     Total Treatment time 40 2       Assessment: [x] Progressing toward goals. Cont to initiate session with MHP/ES to promote pain relief and muscle relaxation. Pt in good SIJ alignment today. Min tenderness to palpation over bilat piriformis areas. Cont with stretches/ex per log above with fair tolerance. Discussed TA contr while lifting and standing at work to engage core and reduce strain on LB.     [] No change.      [] Other:  [x] Patient would continue to benefit from skilled physical therapy services in order to: decrease pain, improve function, increase

## 2021-05-18 ENCOUNTER — HOSPITAL ENCOUNTER (OUTPATIENT)
Dept: PHYSICAL THERAPY | Facility: CLINIC | Age: 51
Setting detail: THERAPIES SERIES
Discharge: HOME | End: 2021-05-18
Payer: COMMERCIAL

## 2021-05-18 PROCEDURE — 97140 MANUAL THERAPY 1/> REGIONS: CPT

## 2021-05-18 PROCEDURE — 97110 THERAPEUTIC EXERCISES: CPT

## 2021-05-18 PROCEDURE — G0283 ELEC STIM OTHER THAN WOUND: HCPCS

## 2021-05-18 NOTE — FLOWSHEET NOTE
[] [facility]  [facility]  [address]  [phone]  F: [phone] [] [address]  [facility] [address]   [address]  P: [phone]  F: [phone] [x] [address]  Outpatient Rehabilitation &  Therapy  [address] [facility]  P: [phone]  F: [phone] [] [address]  P: [phone]  F: [phone] [] [address] [facility]   [address]   [place]: [phone]  F: [phone]      Physical Therapy Daily Treatment Note    Date:  2021  Patient Name:  Nidhi Andres    :  1970  MRN: [id number]  Physician: Yany Leblanc                                 Insurance: [employer] (30 vs)  Medical Diagnosis: Coccysdynia, Bilateral hip pain                          Rehab Codes: M53.3, M25.551, M25.552, M99.04  Onset Date: 3/18/21                                  Next 's appt:           Visit# / total visits:     Cancels/No Shows: 0/0    Subjective:    Pain:  [x] Yes  [] No Location: Lumbar/SIJ/coccyx  Pain Rating: (0-10 scale) 4/10 7/10 (Tailbone)  Pain altered Tx:  [x] No  [] Yes  Action:  Comments: Pt arrives continuing to state most of her pain stems from the tailbone. Pt admits to non compliance to HEP d/t long work days. Pt overall states she can tell an improvement with hip and groin pain and has a follow up with Dr. Rosalia St on Thursday. Objective:  Precautions: Todays Treatment:    Modalities:   Treatment Location  Left      Right                          [id number]   lumbar [x]? ?          []? ?  [x]? ? Supine    []? ? Prone   []? ? Side lying  []? ? Sitting                                            Treatment Modality   1 Hot Pack:    [x]? ? Large   []? ? Medium    [x]? ? Cervical x2     _____ min     Cold Pack   []? ? Large   []? ? Medium    []? ? Cervical     _____ min     Vasocompression    __° temp    ____pressure     _____ min   1 Electrical Stim:    []? ? IFC     []? ? MFAC      [x]? ?[address]                          Protocol:____analgesic_____X___20__min                          #Channels:  []?? 1        [x]? ? 2       []? ? Other:   PRN Ultrasound: _1.5_____W/cm2 x  __10____min              [x]? ? 1MHz   []? ? 3Mhz                      Duty Factor: [x]? ? 100%  []?? 50%   []? ? 20%                  Add: []?? Lidex   []? ? Stim    []? ? Gel pad        Massage:    []? ? Soft Tissue   []? ? Cross Friction                      []? ? Ice ____min     Traction:   []? ? Prone   []? ? Supine   X _______min               []? ? Lumbar x ____lbs      []? ? Cervical x _____lbs               []? ? Continuous     []? ? Intermittent  -   On:_____x Off:_____   2 Other:  MET, stretches, manual, ex                             Precautions:   Exercises:  Exercise Reps/ Time Weight/ Level Comments   MET 10 5 sec  PRN   Supine PRC ex 20x       Piriformis stretch 5x 10 sec     Hip Flexor stretches 5x 10 sec     Diaphragmatic Breathing   10x       TA activation 10x5\"   cues for technique    TA act w/ Marches  2x10     TA sets with Alt UE 2x10     Bridges x10     Clamshells Next*     Other: Hypervolt to B PF, DI to proximal hip flexors      Treatment Charges: Mins Units   [x]  Modalities:Estim/HP 20/0 1/0   [x]  Ther Exercise 15 1   [x]  Manual Therapy 10 1   []  Ther Activities     []  Aquatics     []  Vasocompression     []  Other     Total Treatment time 45 3       Assessment: [x] Progressing toward goals. Initiated session with MHP/ES to alleviate painful symptoms. Pt in good SIJ alignment this date however displays L sacral rotation with pt educated on MET for correction. Introduced hypervolt to B PF with moderate tension palpated on L side. Pt began to display discomfort with massage gun however was able to tolerate TPR to tender palpated areas.  Hip flexor release applied bilaterally in attempt to reduce tension with fair tolerance. Ended session with stretching and core activation with tactile cues required to ensure proper technique and carryover. Will cont tx as pt tolerates. [] No change. [] Other:  [x] Patient would continue to benefit from skilled physical therapy services in order to: decrease pain, improve function, increase strength    Problems:    [x]? ? ? Pain:  []?? ? ROM:  [x]? ? ? Strength:  [x]? ? ? Function:  []?? Other:       STG: (to be met in 6 treatments)  1. ? Pain:3/10  2. Normal pelvic alignment  3. ? Strength: Improve core strength  4. ? Function: Able to perform all basic ADL's without pain/difficulty  5. Patient to be independent with home exercise program as demonstrated by performance with correct form without cues. LTG: (to be met in 12 treatments)  1. 0/10 pain  2. Oswestry at 0% disability     Patient goals: Get my pain under control     Pt. Education:  [x] Yes  [] No  [] Reviewed Prior HEP/Ed  Method of Education: [x] Verbal  [] Demo  [] Written  Comprehension of Education:  [x] Verbalizes understanding. [] Demonstrates understanding. [] Needs review. [] Demonstrates/verbalizes HEP/Ed previously given. Plan: [x] Continue current frequency toward long and short term goals. [x] Specific Instructions for subsequent treatments:  Recheck pelvic alignment, Cont with modalities and stretches/ex as indicated, progress core ex as technique of TA activation improves      Time In: 11:00 am          Time Out:  12:02 pm    Electronically signed by:   [place], [place]

## 2021-05-20 ENCOUNTER — OFFICE VISIT (OUTPATIENT)
Dept: ORTHOPEDIC SURGERY | Age: 51
End: 2021-05-20
Payer: COMMERCIAL

## 2021-05-20 VITALS — TEMPERATURE: 98 F | WEIGHT: 170 LBS | BODY MASS INDEX: 26.68 KG/M2 | HEIGHT: 67 IN

## 2021-05-20 DIAGNOSIS — M25.551 BILATERAL HIP PAIN: ICD-10-CM

## 2021-05-20 DIAGNOSIS — M53.3 COCCYXDYNIA: Primary | ICD-10-CM

## 2021-05-20 DIAGNOSIS — M25.552 BILATERAL HIP PAIN: ICD-10-CM

## 2021-05-20 PROCEDURE — 99213 OFFICE O/P EST LOW 20 MIN: CPT | Performed by: ORTHOPAEDIC SURGERY

## 2021-05-20 NOTE — PROGRESS NOTES
Patient ID: Kelvin Gaspar is a 53 y.o. female    Chief Compliant:  No chief complaint on file. HPI:  This is a 53 y.o. female who presents to the clinic today for coccyxdynia and bilateral hip pain    She notes PT has improved her bilateral hip pain significantly, she does still have some mild coccyx pain    Review of Systems   All other systems reviewed and are negative. Past History:    Current Outpatient Medications:     Fexofenadine HCl (ALLEGRA ALLERGY PO), Take by mouth, Disp: , Rfl:     vitamin C (ASCORBIC ACID) 500 MG tablet, Take 500 mg by mouth daily, Disp: , Rfl:     VITAMIN D PO, Take by mouth, Disp: , Rfl:     Glucosamine-MSM-Hyaluronic Acd (JOINT HEALTH PO), Take by mouth, Disp: , Rfl:     Doxylamine Succinate, Sleep, (UNISOM PO), Take by mouth, Disp: , Rfl:     ibuprofen (ADVIL;MOTRIN) 200 MG tablet, Take 200 mg by mouth every 6 hours as needed for Pain, Disp: , Rfl:   No Known Allergies  Social History     Socioeconomic History    Marital status:      Spouse name: Not on file    Number of children: Not on file    Years of education: Not on file    Highest education level: Not on file   Occupational History    Not on file   Tobacco Use    Smoking status: Never Smoker    Smokeless tobacco: Never Used   Substance and Sexual Activity    Alcohol use: Yes     Alcohol/week: 0.0 standard drinks    Drug use: No    Sexual activity: Not on file   Other Topics Concern    Not on file   Social History Narrative    Not on file     Social Determinants of Health     Financial Resource Strain:     Difficulty of Paying Living Expenses:    Food Insecurity:     Worried About Running Out of Food in the Last Year:     [address] in the Last Year:    Transportation Needs:     Lack of Transportation (Medical):      Lack of Transportation (Non-Medical):    Physical Activity:     Days of Exercise per Week:     Minutes of Exercise per Session:    Stress:     Feeling of Stress :

## 2021-05-28 ENCOUNTER — HOSPITAL ENCOUNTER (OUTPATIENT)
Dept: PHYSICAL THERAPY | Facility: CLINIC | Age: 51
Setting detail: THERAPIES SERIES
Discharge: HOME | End: 2021-05-28
Payer: COMMERCIAL

## 2021-05-28 PROCEDURE — G0283 ELEC STIM OTHER THAN WOUND: HCPCS

## 2021-05-28 PROCEDURE — 97110 THERAPEUTIC EXERCISES: CPT

## 2021-05-28 NOTE — FLOWSHEET NOTE
[] [facility]) [facility] &  Therapy  [address]  [phone]  F: [phone] [] [address]  [facility] [address]   [address]  P: [phone]  F: [phone] [x] [address] &  Therapy  [address] [facility]  P: [phone]  F: [phone] [] [address]  P: [phone]  F: [phone] [] [address]  [facility]   [address]   [place]: [phone]  F: [phone]      Physical Therapy Daily Treatment Note    Date:  2021  Patient Name:  Kristi Almanza    :  1970  MRN: [id number]  Physician: Albino Sunshine                                 Insurance: [employer] (30 vs)  Medical Diagnosis: Coccysdynia, Bilateral hip pain                          Rehab Codes: M53.3, M25.551, M25.552, M99.04  Onset Date: 3/18/21                                  Next 's appt:           Visit# / total visits: 10/12    Cancels/No Shows: 0/0    Subjective:    Pain:  [x] Yes  [] No Location: Lumbar/SIJ/coccyx  Pain Rating: (0-10 scale) 1/10  Pain altered Tx:  [x] No  [] Yes  Action:  Comments: Pt had her follow up with Dr. Jluis Lauren who gave her the option of an injection which she declined. States overall her pain level is improved, can still feel tailbone pain when sitting a certain way. Went for a 7 mile bike road in which she had no issue. Objective:  Precautions: Todays Treatment:    Modalities:   Treatment Location  Left      Right                          [id number]   lumbar [x]? ?          []? ?  [x]? ? Supine    []? ? Prone   []? ? Side lying  []? ? Sitting                                            Treatment Modality   1 Hot Pack:    [x]? ? Large   []? ? Medium    [x]? ? Cervical x2     _____ min     Cold Pack   []? ? Large   []? ? Medium    []? ? Cervical     _____ min     Vasocompression    __° temp    ____pressure     _____ min   1 Electrical Stim:    []? ? IFC     []? ? MFAC      [x]? ?[address]                          Protocol:____analgesic_____X___20__min                          #Channels:  []?? 1        [x]? ? 2       []? ? Other:   PRN Ultrasound: _1.5_____W/cm2 x  __10____min              [x]? ? 1MHz   []? ? 3Mhz                      Duty Factor: [x]? ? 100%  []?? 50%   []? ? 20%                  Add: []?? Lidex   []? ? Stim    []? ? Gel pad        Massage:    []? ? Soft Tissue   []? ? Cross Friction                      []? ? Ice ____min     Traction:   []? ? Prone   []? ? Supine   X _______min               []? ? Lumbar x ____lbs      []? ? Cervical x _____lbs               []? ? Continuous     []? ? Intermittent  -   On:_____x Off:_____   2 Other:  MET, stretches, manual, ex                             Precautions:   Exercises:  Exercise Reps/ Time Weight/ Level Comments   MET 10 5 sec  PRN   Supine PRC ex 20x       Piriformis stretch 5x 10 sec     Hip Flexor stretches 5x 10 sec     Diaphragmatic Breathing   10x       TA activation 10x5\"   cues for technique    TA act w/ Marches  2x10     TA sets with Alt UE 2x10     Bridges x10     Cl[employer]hells Next*     Other: Hypervolt to B PF, DI to proximal hip flexors - not today      Treatment Charges: Mins Units   [x]  Modalities:Estim/HP 20/0 1/0   [x]  Ther Exercise 25 2   []  Manual Therapy     []  Ther Activities     []  Aquatics     []  Vasocompression     []  Other     Total Treatment time 45 3       Assessment: [x] Progressing toward goals. Pt presents in alignment with innominates, however slight R sacral rotation noted. Cont with MHP/ES followed by MET sacral correction. Reviewed HEP this date and issued updated handout. Plan to place therapy on hold at this point and pt is to continue with independent HEP. Pt is to call and get back on the schedule if needed. Pt voices understanding to all and is confident with independent HEP. [] No change. [] Other:  [x] Patient would continue to benefit from skilled physical therapy services in order to: decrease pain, improve function, increase strength    Problems:    [x]? ? ? Pain:  []?? ? ROM:  [x]? ? ? Strength:  [x]? ? ? Function:  []?? Other:       STG: (to be met in 6 treatments)  1. ? Pain:3/10  2. Normal pelvic alignment  3. ? Strength: Improve core strength  4. ? Function: Able to perform all basic ADL's without pain/difficulty  5. Patient to be independent with home exercise program as demonstrated by performance with correct form without cues. LTG: (to be met in 12 treatments)  1. 0/10 pain  2. Oswestry at 0% disability     Patient goals: Get my pain under control     Pt. Education:  [x] Yes  [] No  [] Reviewed Prior HEP/Ed  Method of Education: [x] Verbal  [] Demo  [x] Written  Comprehension of Education:  [x] Verbalizes understanding. [] Demonstrates understanding. [] Needs review. [] Demonstrates/verbalizes HEP/Ed previously given. Plan: [x] Continue current frequency toward long and short term goals.     [x] PT placed on hold at this point for independent HEP, is to call and get back on the schedule if needed      Time In: 1:00 pm          Time Out:  1:50 pm    Electronically signed by:  Keyona Esposito PTA

## 2021-07-15 ENCOUNTER — HOSPITAL ENCOUNTER (OUTPATIENT)
Age: 51
Setting detail: SPECIMEN
Discharge: HOME | End: 2021-07-15
Payer: COMMERCIAL

## 2021-07-15 ENCOUNTER — OFFICE VISIT (OUTPATIENT)
Dept: PRIMARY CARE CLINIC | Age: 51
End: 2021-07-15
Payer: COMMERCIAL

## 2021-07-15 VITALS
HEIGHT: 67 IN | BODY MASS INDEX: 27.72 KG/M2 | HEART RATE: 54 BPM | OXYGEN SATURATION: 96 % | SYSTOLIC BLOOD PRESSURE: 118 MMHG | WEIGHT: 176.6 LBS | DIASTOLIC BLOOD PRESSURE: 72 MMHG | RESPIRATION RATE: 14 BRPM

## 2021-07-15 DIAGNOSIS — Z13.220 SCREENING FOR LIPID DISORDERS: ICD-10-CM

## 2021-07-15 DIAGNOSIS — Z00.00 ENCOUNTER FOR GENERAL ADULT MEDICAL EXAMINATION W/O ABNORMAL FINDINGS: Primary | ICD-10-CM

## 2021-07-15 DIAGNOSIS — Z13.1 SCREENING FOR DIABETES MELLITUS: ICD-10-CM

## 2021-07-15 DIAGNOSIS — Z13.29 SCREENING FOR THYROID DISORDER: ICD-10-CM

## 2021-07-15 DIAGNOSIS — Z13.0 SCREENING FOR DEFICIENCY ANEMIA: ICD-10-CM

## 2021-07-15 DIAGNOSIS — Z11.59 NEED FOR HEPATITIS C SCREENING TEST: ICD-10-CM

## 2021-07-15 DIAGNOSIS — Z12.11 SCREEN FOR COLON CANCER: ICD-10-CM

## 2021-07-15 LAB
ALBUMIN SERPL-MCNC: 4.2 G/DL (ref 3.5–5.2)
ALBUMIN/GLOBULIN RATIO: 1.6 (ref 1–2.5)
ALP BLD-CCNC: 66 U/L (ref 35–104)
ALT SERPL-CCNC: 22 U/L (ref 5–33)
ANION GAP SERPL CALCULATED.3IONS-SCNC: 10 MMOL/L (ref 9–17)
AST SERPL-CCNC: 22 U/L
BILIRUB SERPL-MCNC: 0.34 MG/DL (ref 0.3–1.2)
BUN BLDV-MCNC: 15 MG/DL (ref 6–20)
BUN/CREAT BLD: NORMAL (ref 9–20)
CALCIUM SERPL-MCNC: 9.3 MG/DL (ref 8.6–10.4)
CHLORIDE BLD-SCNC: 105 MMOL/L (ref 98–107)
CHOLESTEROL/HDL RATIO: 3.9
CHOLESTEROL: 196 MG/DL
CO2: 24 MMOL/L (ref 20–31)
CREAT SERPL-MCNC: 0.54 MG/DL (ref 0.5–0.9)
ESTIMATED AVERAGE GLUCOSE: 103 MG/DL
GFR AFRICAN AMERICAN: >60 ML/MIN
GFR NON-AFRICAN AMERICAN: >60 ML/MIN
GFR SERPL CREATININE-BSD FRML MDRD: NORMAL ML/MIN/{1.73_M2}
GFR SERPL CREATININE-BSD FRML MDRD: NORMAL ML/MIN/{1.73_M2}
GLUCOSE BLD-MCNC: 88 MG/DL (ref 70–99)
HBA1C MFR BLD: 5.2 % (ref 4–6)
HCT VFR BLD CALC: 41.1 % (ref 36.3–47.1)
HDLC SERPL-MCNC: 50 MG/DL
HEMOGLOBIN: 13.1 G/DL (ref 11.9–15.1)
HEPATITIS C ANTIBODY: NONREACTIVE
LDL CHOLESTEROL: 128 MG/DL (ref 0–130)
MCH RBC QN AUTO: 30.8 PG (ref 25.2–33.5)
MCHC RBC AUTO-ENTMCNC: 31.9 G/DL (ref 28.4–34.8)
MCV RBC AUTO: 96.7 FL (ref 82.6–102.9)
NRBC AUTOMATED: 0 PER 100 WBC
PDW BLD-RTO: 12.7 % (ref 11.8–14.4)
PLATELET # BLD: 227 K/UL (ref 138–453)
PMV BLD AUTO: 11.2 FL (ref 8.1–13.5)
POTASSIUM SERPL-SCNC: 4.4 MMOL/L (ref 3.7–5.3)
RBC # BLD: 4.25 M/UL (ref 3.95–5.11)
SODIUM BLD-SCNC: 139 MMOL/L (ref 135–144)
TOTAL PROTEIN: 6.8 G/DL (ref 6.4–8.3)
TRIGL SERPL-MCNC: 88 MG/DL
TSH SERPL DL<=0.05 MIU/L-ACNC: 1.57 MIU/L (ref 0.3–5)
VLDLC SERPL CALC-MCNC: NORMAL MG/DL (ref 1–30)
WBC # BLD: 4.8 K/UL (ref 3.5–11.3)

## 2021-07-15 PROCEDURE — 99396 PREV VISIT EST AGE 40-64: CPT | Performed by: NURSE PRACTITIONER

## 2021-07-15 SDOH — ECONOMIC STABILITY: FOOD INSECURITY: WITHIN THE PAST 12 MONTHS, THE FOOD YOU BOUGHT JUST DIDN'T LAST AND YOU DIDN'T HAVE MONEY TO GET MORE.: NEVER TRUE

## 2021-07-15 SDOH — ECONOMIC STABILITY: FOOD INSECURITY: WITHIN THE PAST 12 MONTHS, YOU WORRIED THAT YOUR FOOD WOULD RUN OUT BEFORE YOU GOT MONEY TO BUY MORE.: NEVER TRUE

## 2021-07-15 ASSESSMENT — PATIENT HEALTH QUESTIONNAIRE - PHQ9
2. FEELING DOWN, DEPRESSED OR HOPELESS: 0
SUM OF ALL RESPONSES TO PHQ9 QUESTIONS 1 & 2: 0
SUM OF ALL RESPONSES TO PHQ QUESTIONS 1-9: 0
SUM OF ALL RESPONSES TO PHQ QUESTIONS 1-9: 0
1. LITTLE INTEREST OR PLEASURE IN DOING THINGS: 0
SUM OF ALL RESPONSES TO PHQ QUESTIONS 1-9: 0

## 2021-07-15 ASSESSMENT — ENCOUNTER SYMPTOMS
ABDOMINAL PAIN: 0
SHORTNESS OF BREATH: 0
BACK PAIN: 0
COUGH: 0

## 2021-07-15 ASSESSMENT — SOCIAL DETERMINANTS OF HEALTH (SDOH): HOW HARD IS IT FOR YOU TO PAY FOR THE VERY BASICS LIKE FOOD, HOUSING, MEDICAL CARE, AND HEATING?: NOT HARD AT ALL

## 2021-07-15 NOTE — PROGRESS NOTES
[facility] CARE  [facility] [address]  [address] [address]  Dept: [phone]  Dept Fax: [phone]    Kimmy Paul is a 53 y.o. female who presentstoday for her medical conditions/complaints as noted below. Kimmy Paul is c/o of  Chief Complaint   Patient presents with    Annual Exam           HPI:     Presents for annual exam  BP well controlled  Weight is stable through 2021 but has increased over past years  Feels due to covid  Will be working on diet/exercise  Pending lab results would like to consider weight loss medication    Has form to complete  Willing to update annual labs  Willing to complete colonoscopy  Follows with Dr. Goetz Persons for GYN for paps/mammogram    Denies any other problems/concerns      No results found for: LABA1C          ( goal A1C is < 7)   No results found for: LABMICR  LDL Cholesterol (mg/dL)   Date Value   08/28/2020 135 (H)   08/22/2019 113   07/05/2018 106     LDL Calculated (mg/dL)   Date Value   05/23/2013 117       (goal LDL is <100)   AST (U/L)   Date Value   08/28/2020 17     ALT (U/L)   Date Value   08/28/2020 15     BUN (mg/dL)   Date Value   08/28/2020 15     BP Readings from Last 3 Encounters:   07/15/21 118/72   01/14/21 118/76   03/23/20 116/62          ([id number])    History reviewed. No pertinent past medical history. Past Surgical History:   Procedure Laterality Date    HYSTERECTOMY, TOTAL ABDOMINAL      left ovarier        Family History   Problem Relation Age of Onset    Brain Cancer Father           Social History     Tobacco Use    Smoking status: Never Smoker    Smokeless tobacco: Never Used   Substance Use Topics    Alcohol use:  Yes     Alcohol/week: 0.0 standard drinks      Current Outpatient Medications   Medication Sig Dispense Refill    Acetaminophen (TYLENOL 8 HOUR PO) Take by mouth      Fexofenadine HCl (ALLEGRA ALLERGY PO) Take by mouth      vitamin C (ASCORBIC ACID) 500 MG tablet Take 500 mg by mouth daily      VITAMIN D PO Take by mouth      Doxylamine Succinate, Sleep, (UNISOM PO) Take by mouth      ibuprofen (ADVIL;MOTRIN) 200 MG tablet Take 200 mg by mouth every 6 hours as needed for Pain       No current facility-administered medications for this visit. No Known Allergies    Health Maintenance   Topic Date Due    Hepatitis C screen  Never done    Diabetes screen  Never done    Colon cancer screen colonoscopy  Never done    Flu vaccine (1) 09/01/2021    Lipid screen  08/28/2025    DTaP/Tdap/Td vaccine (2 - Td or Tdap) 07/05/2028    COVID-19 Vaccine  Completed    HIV screen  Completed    Hepatitis A vaccine  Aged Out    Hepatitis B vaccine  Aged Out    Hib vaccine  Aged Out    Meningococcal (ACWY) vaccine  Aged Out    Pneumococcal 0-64 years Vaccine  Aged Out       Subjective:      Review of Systems   Constitutional: Negative for chills, fatigue and fever. HENT: Negative for congestion. Eyes: Negative for visual disturbance. Respiratory: Negative for cough and shortness of breath. Cardiovascular: Negative for chest pain and palpitations. Gastrointestinal: Negative for abdominal pain. Genitourinary: Negative for dysuria. Musculoskeletal: Negative for back pain. Neurological: Negative for dizziness, numbness and headaches. Psychiatric/Behavioral: Negative for self-injury, sleep disturbance and suicidal ideas. The patient is not nervous/anxious. Objective:     Physical Exam  Vitals and nursing note reviewed. Constitutional:       Appearance: She is well-developed. HENT:      Head: Normocephalic and atraumatic. Eyes:      Pupils: Pupils are equal, round, and reactive to light. Cardiovascular:      Rate and Rhythm: Normal rate and regular rhythm. Heart sounds: Normal heart sounds. Pulmonary:      Effort: Pulmonary effort is normal.      Breath sounds: Normal breath sounds.    Abdominal:      General: Bowel sounds are normal. Reflex     Standing Status:   Future     Number of Occurrences:   1     Standing Expiration Date:   7/15/2022    Hemoglobin A1C     Standing Status:   Future     Number of Occurrences:   1     Standing Expiration Date:   7/15/2022    Hepatitis C Antibody     Standing Status:   Future     Number of Occurrences:   1     Standing Expiration Date:   7/15/2022   [facility] - Mary Tejada DO, General Surgery, [place]     Referral Priority:   Routine     Referral Type:   Eval and Treat     Referral Reason:   Specialty Services Required     Referred to Provider:   Dennis Laureano DO     Requested Specialty:   General Surgery     Number of Visits Requested:   1    HM COLONOSCOPY     Standing Status:   Future     Standing Expiration Date:   1/15/2023      No orders of the defined types were placed in this encounter. Patient given educational materials - see patient instructions. Discussed use, benefit, and side effects of prescribed medications. All patientquestions answered. Pt voiced understanding. Reviewed health maintenance. Instructedto continue current medications, diet and exercise. Patient agreed with treatmentplan. Follow up as directed.      Electronicallysigned by JOHN Bingham CNP on 7/15/2021 at 9:04 AM

## 2021-08-18 ENCOUNTER — TELEPHONE (OUTPATIENT)
Dept: SURGERY | Age: 51
End: 2021-08-18

## 2021-08-18 NOTE — TELEPHONE ENCOUNTER
Patient has a referral in the queue and chart for a colon screening. Please contact the patient at [phone] to schedule. Thank you.

## 2021-09-25 DIAGNOSIS — E66.9 OBESITY WITH SERIOUS COMORBIDITY, UNSPECIFIED CLASSIFICATION, UNSPECIFIED OBESITY TYPE: Primary | ICD-10-CM

## 2021-09-25 RX ORDER — PHENTERMINE HYDROCHLORIDE 37.5 MG/1
37.5 CAPSULE ORAL EVERY MORNING
Qty: 30 CAPSULE | Refills: 0 | Status: SHIPPED | OUTPATIENT
Start: 2021-09-25 | End: 2021-10-24 | Stop reason: SDUPTHER

## 2021-10-14 ENCOUNTER — TELEPHONE (OUTPATIENT)
Dept: SURGERY | Age: 51
End: 2021-10-14

## 2021-10-24 DIAGNOSIS — E66.9 OBESITY WITH SERIOUS COMORBIDITY, UNSPECIFIED CLASSIFICATION, UNSPECIFIED OBESITY TYPE: ICD-10-CM

## 2021-10-25 RX ORDER — PHENTERMINE HYDROCHLORIDE 37.5 MG/1
37.5 CAPSULE ORAL EVERY MORNING
Qty: 30 CAPSULE | Refills: 0 | Status: SHIPPED | OUTPATIENT
Start: 2021-10-25 | End: 2021-11-23 | Stop reason: SDUPTHER

## 2021-10-29 ENCOUNTER — OFFICE VISIT (OUTPATIENT)
Dept: PRIMARY CARE CLINIC | Age: 51
End: 2021-10-29
Payer: COMMERCIAL

## 2021-10-29 VITALS
OXYGEN SATURATION: 96 % | SYSTOLIC BLOOD PRESSURE: 118 MMHG | RESPIRATION RATE: 13 BRPM | HEART RATE: 63 BPM | BODY MASS INDEX: 26.93 KG/M2 | HEIGHT: 67 IN | WEIGHT: 171.6 LBS | DIASTOLIC BLOOD PRESSURE: 80 MMHG

## 2021-10-29 DIAGNOSIS — J30.89 CHRONIC NON-SEASONAL ALLERGIC RHINITIS: Primary | ICD-10-CM

## 2021-10-29 DIAGNOSIS — E66.9 OBESITY WITH SERIOUS COMORBIDITY, UNSPECIFIED CLASSIFICATION, UNSPECIFIED OBESITY TYPE: ICD-10-CM

## 2021-10-29 PROCEDURE — 99214 OFFICE O/P EST MOD 30 MIN: CPT | Performed by: NURSE PRACTITIONER

## 2021-10-29 ASSESSMENT — ENCOUNTER SYMPTOMS
COUGH: 0
BACK PAIN: 0
SHORTNESS OF BREATH: 0
ABDOMINAL PAIN: 0

## 2021-10-29 ASSESSMENT — PATIENT HEALTH QUESTIONNAIRE - PHQ9
SUM OF ALL RESPONSES TO PHQ QUESTIONS 1-9: 0
1. LITTLE INTEREST OR PLEASURE IN DOING THINGS: 0
SUM OF ALL RESPONSES TO PHQ QUESTIONS 1-9: 0
SUM OF ALL RESPONSES TO PHQ QUESTIONS 1-9: 0
2. FEELING DOWN, DEPRESSED OR HOPELESS: 0
SUM OF ALL RESPONSES TO PHQ9 QUESTIONS 1 & 2: 0

## 2021-10-29 NOTE — PROGRESS NOTES
[facility] PRIMARY CARE  [facility] [address] [facility] [address]  [address] [address]  Dept: [phone]  Dept Fax: [phone]    Cassi Yu is a 53 y.o. female who presentstoday for her medical conditions/complaints as noted below. Cassi Yu is c/o of  Chief Complaint   Patient presents with    Weight Loss    1 Month Follow-Up         HPI:     Presents for recheck on chronic conditions  BP well controlled  Has lost 5lb since last OV, but 9lb per home scale  Working on diet/exercise    Has started adipex, feels it is working well for her  Noted improvement in energy and decrease in appetite  Originally felt off with medication but feels she has adjusted well  Denies any side effects with use of med  Her goal is to get to 150lb ideally    C/o headaches, feels that it is normal for her and not related to adipex use    Denies any other problems/concerns        Hemoglobin A1C (%)   Date Value   07/15/2021 5.2             ( goal A1C is < 7)   No results found for: LABMICR  LDL Cholesterol (mg/dL)   Date Value   07/15/2021 128   2020 135 (H)   2019 113     LDL Calculated (mg/dL)   Date Value   2013 117       (goal LDL is <100)   AST (U/L)   Date Value   07/15/2021 22     ALT (U/L)   Date Value   07/15/2021 22     BUN (mg/dL)   Date Value   07/15/2021 15     BP Readings from Last 3 Encounters:   10/29/21 118/80   07/15/21 118/72   21 118/76          ([id number])    History reviewed. No pertinent past medical history. Past Surgical History:   Procedure Laterality Date    HYSTERECTOMY, TOTAL ABDOMINAL      left ovarier        Family History   Problem Relation Age of Onset    Brain Cancer Father           Social History     Tobacco Use    Smoking status: Never Smoker    Smokeless tobacco: Never Used   Substance Use Topics    Alcohol use:  Yes     Alcohol/week: 0.0 standard drinks      Current Outpatient Medications   Medication Sig Dispense Refill  phentermine 37.5 MG capsule Take 1 capsule by mouth every morning for 30 days. 30 capsule 0    Acetaminophen (TYLENOL 8 HOUR PO) Take by mouth      Fexofenadine HCl (ALLEGRA ALLERGY PO) Take by mouth      vitamin C (ASCORBIC ACID) 500 MG tablet Take 500 mg by mouth daily      VITAMIN D PO Take by mouth      Doxylamine Succinate, Sleep, (UNISOM PO) Take by mouth      ibuprofen (ADVIL;MOTRIN) 200 MG tablet Take 200 mg by mouth every 6 hours as needed for Pain       No current facility-administered medications for this visit. No Known Allergies    Health Maintenance   Topic Date Due    COVID-19 Vaccine (1) Never done    Colon cancer screen colonoscopy  Never done    Lipid screen  07/15/2026    DTaP/Tdap/Td vaccine (2 - Td or Tdap) 07/05/2028    Flu vaccine  Completed    Hepatitis C screen  Completed    HIV screen  Completed    Hepatitis A vaccine  Aged Out    Hepatitis B vaccine  Aged Out    Hib vaccine  Aged Out    Meningococcal (ACWY) vaccine  Aged Out    Pneumococcal 0-64 years Vaccine  Aged Out       Subjective:      Review of Systems   Constitutional: Negative for chills, fatigue and fever. HENT: Negative for congestion. Eyes: Negative for visual disturbance. Respiratory: Negative for cough and shortness of breath. Cardiovascular: Negative for chest pain and palpitations. Gastrointestinal: Negative for abdominal pain. Genitourinary: Negative for dysuria. Musculoskeletal: Negative for back pain. Neurological: Negative for dizziness, numbness and headaches. Psychiatric/Behavioral: Negative for self-injury, sleep disturbance and suicidal ideas. The patient is not nervous/anxious. Objective:     Physical Exam  Vitals and nursing note reviewed. Constitutional:       Appearance: She is well-developed. HENT:      Head: Normocephalic and atraumatic. Eyes:      Pupils: Pupils are equal, round, and reactive to light.    Cardiovascular:      Rate and Rhythm: Normal rate and regular rhythm. Heart sounds: Normal heart sounds. Pulmonary:      Effort: Pulmonary effort is normal.      Breath sounds: Normal breath sounds. Abdominal:      General: Bowel sounds are normal.      Palpations: Abdomen is soft. Tenderness: There is no abdominal tenderness. Musculoskeletal:         General: Normal range of motion. Cervical back: Normal range of motion and neck supple. Skin:     General: Skin is warm and dry. Neurological:      Mental Status: She is alert and oriented to person, place, and time. Psychiatric:         Behavior: Behavior normal.         Thought Content: Thought content normal.         Judgment: Judgment normal.       /80   Pulse 63   Resp 13   Ht 5' 6.5\" (1.689 m)   Wt 171 lb 9.6 oz (77.8 kg)   SpO2 96%   Breastfeeding No   BMI 27.28 kg/m²     Assessment:       Diagnosis Orders   1. Chronic non-seasonal allergic rhinitis     2. Obesity with serious comorbidity, unspecified classification, unspecified obesity type               Plan:      Return in about 1 month (around 11/29/2021) for recheck. 1. Rhinitis- Stable. Continue current meds. Follow up in one month for recheck. 2. Obesity- Improved. Continue diet/exercise. Continue adipex, 2nd month. Follow up in one month for recheck. Patient given educational materials - see patient instructions. Discussed use, benefit, and side effects of prescribed medications. All patientquestions answered. Pt voiced understanding. Reviewed health maintenance. Instructedto continue current medications, diet and exercise. Patient agreed with treatmentplan. Follow up as directed.      Electronicallysigned by JOHN Quiñonez CNP on 10/29/2021 at 8:22 AM

## 2021-11-04 ENCOUNTER — TELEPHONE (OUTPATIENT)
Dept: SURGERY | Age: 51
End: 2021-11-04

## 2021-11-04 NOTE — TELEPHONE ENCOUNTER
11/4/2021- Spoke to patient. Surgery scheduled at [facility]. Patient confirmed and information mailed to patient.      Surgery date/time: 12/9/2021 at 12:15pm  Arrival time: 10:45am  Covid test date: 12/5/2021 at [address] [place] [address] appt: 12/6 at [facility]

## 2021-11-22 ENCOUNTER — TELEPHONE (OUTPATIENT)
Dept: SURGERY | Age: 51
End: 2021-11-22

## 2021-11-22 NOTE — TELEPHONE ENCOUNTER
11/22/2021- Spoke to patient and informed her the colonoscopy procedure time was moved to 10am. Patient confirmed.

## 2021-11-23 ENCOUNTER — PATIENT MESSAGE (OUTPATIENT)
Dept: PRIMARY CARE CLINIC | Age: 51
End: 2021-11-23

## 2021-11-23 DIAGNOSIS — E66.9 OBESITY WITH SERIOUS COMORBIDITY, UNSPECIFIED CLASSIFICATION, UNSPECIFIED OBESITY TYPE: ICD-10-CM

## 2021-11-23 RX ORDER — PHENTERMINE HYDROCHLORIDE 37.5 MG/1
37.5 CAPSULE ORAL EVERY MORNING
Qty: 30 CAPSULE | Refills: 0 | Status: SHIPPED | OUTPATIENT
Start: 2021-11-23 | End: 2021-12-23

## 2021-11-23 NOTE — TELEPHONE ENCOUNTER
From: Eufemia Ennis  To: Clinton Hurley  Sent: 11/23/2021 8:14 AM EST  Subject: Prescription Question    I'm about due for my prescription refill. Putting in a request to have it refilled.     Thank you,  Isaac Corey

## 2021-12-02 ENCOUNTER — TELEPHONE (OUTPATIENT)
Dept: SURGERY | Age: 51
End: 2021-12-02

## 2021-12-03 ENCOUNTER — OFFICE VISIT (OUTPATIENT)
Dept: PRIMARY CARE CLINIC | Age: 51
End: 2021-12-03
Payer: COMMERCIAL

## 2021-12-03 VITALS
WEIGHT: 166.8 LBS | OXYGEN SATURATION: 99 % | RESPIRATION RATE: 16 BRPM | HEIGHT: 67 IN | BODY MASS INDEX: 26.18 KG/M2 | SYSTOLIC BLOOD PRESSURE: 116 MMHG | HEART RATE: 68 BPM | DIASTOLIC BLOOD PRESSURE: 68 MMHG

## 2021-12-03 DIAGNOSIS — H92.01 RIGHT EAR PAIN: ICD-10-CM

## 2021-12-03 DIAGNOSIS — M25.551 RIGHT HIP PAIN: Primary | ICD-10-CM

## 2021-12-03 DIAGNOSIS — M25.552 LEFT HIP PAIN: ICD-10-CM

## 2021-12-03 DIAGNOSIS — E66.9 OBESITY WITH SERIOUS COMORBIDITY, UNSPECIFIED CLASSIFICATION, UNSPECIFIED OBESITY TYPE: ICD-10-CM

## 2021-12-03 PROCEDURE — 99214 OFFICE O/P EST MOD 30 MIN: CPT | Performed by: NURSE PRACTITIONER

## 2021-12-03 RX ORDER — CIPROFLOXACIN AND DEXAMETHASONE 3; 1 MG/ML; MG/ML
4 SUSPENSION/ DROPS AURICULAR (OTIC) 2 TIMES DAILY
Qty: 7.5 ML | Refills: 0 | Status: ON HOLD | OUTPATIENT
Start: 2021-12-03 | End: 2021-12-09 | Stop reason: ALTCHOICE

## 2021-12-03 ASSESSMENT — ENCOUNTER SYMPTOMS
ABDOMINAL PAIN: 0
COUGH: 0
SHORTNESS OF BREATH: 0
BACK PAIN: 0

## 2021-12-03 ASSESSMENT — PATIENT HEALTH QUESTIONNAIRE - PHQ9
2. FEELING DOWN, DEPRESSED OR HOPELESS: 0
SUM OF ALL RESPONSES TO PHQ QUESTIONS 1-9: 0
SUM OF ALL RESPONSES TO PHQ QUESTIONS 1-9: 0
SUM OF ALL RESPONSES TO PHQ9 QUESTIONS 1 & 2: 0
1. LITTLE INTEREST OR PLEASURE IN DOING THINGS: 0
SUM OF ALL RESPONSES TO PHQ QUESTIONS 1-9: 0

## 2021-12-03 NOTE — PATIENT INSTRUCTIONS
Dr. Charmayne Song    Address: [address] [place] [address]  Hours:   Closed ?  Opens 9AM Mon  Updated by business 3 weeks ago  Phone: [phone]

## 2021-12-03 NOTE — PROGRESS NOTES
Outpatient Medications   Medication Sig Dispense Refill    ciprofloxacin-dexamethasone (CIPRODEX) 0.3-0.1 % otic suspension Place 4 drops into the right ear 2 times daily for 7 days 7.5 mL 0    Sodium Sulfate-Mag Sulfate-KCl 3023-239-214 MG TABS Take as directed by office. 24 tablet 0    phentermine 37.5 MG capsule Take 1 capsule by mouth every morning for 30 days. 30 capsule 0    Acetaminophen (TYLENOL 8 HOUR PO) Take by mouth      Fexofenadine HCl (ALLEGRA ALLERGY PO) Take by mouth      vitamin C (ASCORBIC ACID) 500 MG tablet Take 500 mg by mouth daily      VITAMIN D PO Take by mouth      Doxylamine Succinate, Sleep, (UNISOM PO) Take by mouth      ibuprofen (ADVIL;MOTRIN) 200 MG tablet Take 200 mg by mouth every 6 hours as needed for Pain       No current facility-administered medications for this visit. No Known Allergies    Health Maintenance   Topic Date Due    COVID-19 Vaccine (1) Never done    Colon cancer screen colonoscopy  Never done    Lipid screen  07/15/2026    DTaP/Tdap/Td vaccine (2 - Td or Tdap) 07/05/2028    Flu vaccine  Completed    Hepatitis C screen  Completed    HIV screen  Completed    Hepatitis A vaccine  Aged Out    Hepatitis B vaccine  Aged Out    Hib vaccine  Aged Out    Meningococcal (ACWY) vaccine  Aged Out    Pneumococcal 0-64 years Vaccine  Aged Out       Subjective:      Review of Systems   Constitutional: Negative for chills, fatigue and fever. HENT: Positive for ear pain. Negative for congestion. Eyes: Negative for visual disturbance. Respiratory: Negative for cough and shortness of breath. Cardiovascular: Negative for chest pain and palpitations. Gastrointestinal: Negative for abdominal pain. Genitourinary: Negative for dysuria. Musculoskeletal: Positive for arthralgias. Negative for back pain. Neurological: Negative for dizziness, numbness and headaches.    Psychiatric/Behavioral: Negative for self-injury, sleep disturbance and suicidal ideas. The patient is not nervous/anxious. Objective:     Physical Exam  Vitals and nursing note reviewed. Constitutional:       Appearance: She is well-developed. HENT:      Head: Normocephalic and atraumatic. Eyes:      Pupils: Pupils are equal, round, and reactive to light. Cardiovascular:      Rate and Rhythm: Normal rate and regular rhythm. Heart sounds: Normal heart sounds. Pulmonary:      Effort: Pulmonary effort is normal.      Breath sounds: Normal breath sounds. Abdominal:      General: Bowel sounds are normal.      Palpations: Abdomen is soft. Tenderness: There is no abdominal tenderness. Musculoskeletal:         General: Normal range of motion. Cervical back: Normal range of motion and neck supple. Skin:     General: Skin is warm and dry. Neurological:      Mental Status: She is alert and oriented to person, place, and time. Psychiatric:         Behavior: Behavior normal.         Thought Content: Thought content normal.         Judgment: Judgment normal.       /68   Pulse 68   Resp 16   Ht 5' 6.5\" (1.689 m)   Wt 166 lb 12.8 oz (75.7 kg)   SpO2 99%   BMI 26.52 kg/m²     Assessment:       Diagnosis Orders   1. Right hip pain     2. Left hip pain     3. Obesity with serious comorbidity, unspecified classification, unspecified obesity type     4. Right ear pain               Plan:      Return in about 6 months (around 6/3/2022) for annual exam.    1. Bilateral hip pain- Recommend tylenol. Follow up as needed. 2. Obesity- Improved. Continue diet/exercise. Follow up as needed. 3. Right ear pain- Rx given for ciprodex with instruction for use. Follow up if no improvement in symptoms with use of med.     Follow up in six months for recheck/annual exam.     Orders Placed This Encounter   Medications    ciprofloxacin-dexamethasone (CIPRODEX) 0.3-0.1 % otic suspension     Sig: Place 4 drops into the right ear 2 times daily for 7 days     Dispense:  7.5 mL Refill:  0       Patient given educational materials - see patient instructions. Discussed use, benefit, and side effects of prescribed medications. All patientquestions answered. Pt voiced understanding. Reviewed health maintenance. Instructedto continue current medications, diet and exercise. Patient agreed with treatmentplan. Follow up as directed.      Electronicallysigned by JOHN Wyatt CNP on 12/3/2021 at 8:55 AM

## 2021-12-05 ENCOUNTER — HOSPITAL ENCOUNTER (OUTPATIENT)
Dept: LAB | Age: 51
Setting detail: SPECIMEN
Discharge: HOME | End: 2021-12-05
Payer: COMMERCIAL

## 2021-12-05 DIAGNOSIS — Z01.818 PRE-OP TESTING: Primary | ICD-10-CM

## 2021-12-05 PROCEDURE — U0003 INFECTIOUS AGENT DETECTION BY NUCLEIC ACID (DNA OR RNA); SEVERE ACUTE RESPIRATORY SYNDROME CORONAVIRUS 2 (SARS-COV-2) (CORONAVIRUS DISEASE [COVID-19]), AMPLIFIED PROBE TECHNIQUE, MAKING USE OF HIGH THROUGHPUT TECHNOLOGIES AS DESCRIBED BY CMS-2020-01-R: HCPCS

## 2021-12-05 PROCEDURE — U0005 INFEC AGEN DETEC AMPLI PROBE: HCPCS

## 2021-12-06 ENCOUNTER — TELEPHONE (OUTPATIENT)
Dept: SURGERY | Age: 51
End: 2021-12-06

## 2021-12-06 LAB
SARS-COV-2: NORMAL
SARS-COV-2: NOT DETECTED
SOURCE: NORMAL

## 2021-12-06 NOTE — TELEPHONE ENCOUNTER
12/6/2021- Patient arrived for bowel prep appt. Writer went over instructions with patient. Patient verbalized understanding and all questions were answered.

## 2021-12-07 ENCOUNTER — TELEPHONE (OUTPATIENT)
Dept: SURGERY | Age: 51
End: 2021-12-07

## 2021-12-07 NOTE — TELEPHONE ENCOUNTER
Patient calling to check if prior Donata Comber has been done for SuTab prescription? Please call the patient and advise. [phone].

## 2021-12-07 NOTE — TELEPHONE ENCOUNTER
12/7/2021- Spoke to patient, letting her know we are working on the [facility] for [employer] medication.

## 2021-12-08 ENCOUNTER — ANESTHESIA EVENT (OUTPATIENT)
Dept: OPERATING ROOM | Age: 51
End: 2021-12-08
Payer: COMMERCIAL

## 2021-12-08 ENCOUNTER — TELEPHONE (OUTPATIENT)
Dept: SURGERY | Age: 51
End: 2021-12-08

## 2021-12-08 NOTE — TELEPHONE ENCOUNTER
12/8/2021- Spoke to patient. Informing her the procedure start time moved to 1030am but to arrive at 830am. Also informed patient Eunicekirill Rebecca was submitted to her insurance for the sutab. Patient confirmed.

## 2021-12-09 ENCOUNTER — ANESTHESIA (OUTPATIENT)
Dept: OPERATING ROOM | Age: 51
End: 2021-12-09
Payer: COMMERCIAL

## 2021-12-09 ENCOUNTER — HOSPITAL ENCOUNTER (OUTPATIENT)
Age: 51
Setting detail: OUTPATIENT SURGERY
Discharge: HOME | End: 2021-12-09
Attending: SURGERY | Admitting: SURGERY
Payer: COMMERCIAL

## 2021-12-09 VITALS
SYSTOLIC BLOOD PRESSURE: 162 MMHG | RESPIRATION RATE: 22 BRPM | OXYGEN SATURATION: 100 % | DIASTOLIC BLOOD PRESSURE: 89 MMHG

## 2021-12-09 VITALS
TEMPERATURE: 96.8 F | SYSTOLIC BLOOD PRESSURE: 145 MMHG | OXYGEN SATURATION: 100 % | WEIGHT: 167 LBS | DIASTOLIC BLOOD PRESSURE: 83 MMHG | HEIGHT: 67 IN | RESPIRATION RATE: 15 BRPM | BODY MASS INDEX: 26.21 KG/M2 | HEART RATE: 48 BPM

## 2021-12-09 PROCEDURE — 7100000010 HC PHASE II RECOVERY - FIRST 15 MIN: Performed by: SURGERY

## 2021-12-09 PROCEDURE — 3700000001 HC ADD 15 MINUTES (ANESTHESIA): Performed by: SURGERY

## 2021-12-09 PROCEDURE — 2709999900 HC NON-CHARGEABLE SUPPLY: Performed by: SURGERY

## 2021-12-09 PROCEDURE — 2580000003 HC RX 258: Performed by: ANESTHESIOLOGY

## 2021-12-09 PROCEDURE — 7100000011 HC PHASE II RECOVERY - ADDTL 15 MIN: Performed by: SURGERY

## 2021-12-09 PROCEDURE — 2500000003 HC RX 250 WO HCPCS: Performed by: ANESTHESIOLOGY

## 2021-12-09 PROCEDURE — 3700000000 HC ANESTHESIA ATTENDED CARE: Performed by: SURGERY

## 2021-12-09 PROCEDURE — 3609027000 HC COLONOSCOPY: Performed by: SURGERY

## 2021-12-09 PROCEDURE — 45378 DIAGNOSTIC COLONOSCOPY: CPT | Performed by: SURGERY

## 2021-12-09 PROCEDURE — 6360000002 HC RX W HCPCS: Performed by: ANESTHESIOLOGY

## 2021-12-09 RX ORDER — MORPHINE SULFATE 2 MG/ML
2 INJECTION, SOLUTION INTRAMUSCULAR; INTRAVENOUS EVERY 5 MIN PRN
Status: DISCONTINUED | OUTPATIENT
Start: 2021-12-09 | End: 2021-12-09 | Stop reason: HOSPADM

## 2021-12-09 RX ORDER — ONDANSETRON 2 MG/ML
4 INJECTION INTRAMUSCULAR; INTRAVENOUS
Status: DISCONTINUED | OUTPATIENT
Start: 2021-12-09 | End: 2021-12-09 | Stop reason: HOSPADM

## 2021-12-09 RX ORDER — SODIUM CHLORIDE, SODIUM LACTATE, POTASSIUM CHLORIDE, CALCIUM CHLORIDE 600; 310; 30; 20 MG/100ML; MG/100ML; MG/100ML; MG/100ML
INJECTION, SOLUTION INTRAVENOUS CONTINUOUS
Status: DISCONTINUED | OUTPATIENT
Start: 2021-12-09 | End: 2021-12-09 | Stop reason: HOSPADM

## 2021-12-09 RX ORDER — PROPOFOL 10 MG/ML
INJECTION, EMULSION INTRAVENOUS PRN
Status: DISCONTINUED | OUTPATIENT
Start: 2021-12-09 | End: 2021-12-09 | Stop reason: SDUPTHER

## 2021-12-09 RX ORDER — SODIUM CHLORIDE 9 MG/ML
25 INJECTION, SOLUTION INTRAVENOUS PRN
Status: DISCONTINUED | OUTPATIENT
Start: 2021-12-09 | End: 2021-12-09 | Stop reason: HOSPADM

## 2021-12-09 RX ORDER — SODIUM CHLORIDE 0.9 % (FLUSH) 0.9 %
10 SYRINGE (ML) INJECTION EVERY 12 HOURS SCHEDULED
Status: DISCONTINUED | OUTPATIENT
Start: 2021-12-09 | End: 2021-12-09 | Stop reason: HOSPADM

## 2021-12-09 RX ORDER — SODIUM CHLORIDE 0.9 % (FLUSH) 0.9 %
10 SYRINGE (ML) INJECTION PRN
Status: DISCONTINUED | OUTPATIENT
Start: 2021-12-09 | End: 2021-12-09 | Stop reason: HOSPADM

## 2021-12-09 RX ORDER — PROPOFOL 10 MG/ML
INJECTION, EMULSION INTRAVENOUS CONTINUOUS PRN
Status: DISCONTINUED | OUTPATIENT
Start: 2021-12-09 | End: 2021-12-09 | Stop reason: SDUPTHER

## 2021-12-09 RX ORDER — LIDOCAINE HYDROCHLORIDE 10 MG/ML
INJECTION, SOLUTION EPIDURAL; INFILTRATION; INTRACAUDAL; PERINEURAL PRN
Status: DISCONTINUED | OUTPATIENT
Start: 2021-12-09 | End: 2021-12-09 | Stop reason: SDUPTHER

## 2021-12-09 RX ORDER — LIDOCAINE HYDROCHLORIDE 10 MG/ML
1 INJECTION, SOLUTION EPIDURAL; INFILTRATION; INTRACAUDAL; PERINEURAL
Status: DISCONTINUED | OUTPATIENT
Start: 2021-12-09 | End: 2021-12-09 | Stop reason: HOSPADM

## 2021-12-09 RX ORDER — MIDAZOLAM HYDROCHLORIDE 2 MG/2ML
1 INJECTION, SOLUTION INTRAMUSCULAR; INTRAVENOUS ONCE
Status: DISCONTINUED | OUTPATIENT
Start: 2021-12-09 | End: 2021-12-09 | Stop reason: HOSPADM

## 2021-12-09 RX ORDER — LABETALOL 20 MG/4 ML (5 MG/ML) INTRAVENOUS SYRINGE
10 EVERY 10 MIN PRN
Status: DISCONTINUED | OUTPATIENT
Start: 2021-12-09 | End: 2021-12-09 | Stop reason: HOSPADM

## 2021-12-09 RX ORDER — 0.9 % SODIUM CHLORIDE 0.9 %
500 INTRAVENOUS SOLUTION INTRAVENOUS
Status: DISCONTINUED | OUTPATIENT
Start: 2021-12-09 | End: 2021-12-09 | Stop reason: HOSPADM

## 2021-12-09 RX ORDER — HYDRALAZINE HYDROCHLORIDE 20 MG/ML
10 INJECTION INTRAMUSCULAR; INTRAVENOUS EVERY 10 MIN PRN
Status: DISCONTINUED | OUTPATIENT
Start: 2021-12-09 | End: 2021-12-09 | Stop reason: HOSPADM

## 2021-12-09 RX ORDER — OXYCODONE HYDROCHLORIDE AND ACETAMINOPHEN 5; 325 MG/1; MG/1
2 TABLET ORAL PRN
Status: DISCONTINUED | OUTPATIENT
Start: 2021-12-09 | End: 2021-12-09 | Stop reason: HOSPADM

## 2021-12-09 RX ORDER — MEPERIDINE HYDROCHLORIDE 50 MG/ML
12.5 INJECTION INTRAMUSCULAR; INTRAVENOUS; SUBCUTANEOUS EVERY 5 MIN PRN
Status: DISCONTINUED | OUTPATIENT
Start: 2021-12-09 | End: 2021-12-09 | Stop reason: HOSPADM

## 2021-12-09 RX ORDER — PROMETHAZINE HYDROCHLORIDE 25 MG/ML
6.25 INJECTION, SOLUTION INTRAMUSCULAR; INTRAVENOUS
Status: DISCONTINUED | OUTPATIENT
Start: 2021-12-09 | End: 2021-12-09 | Stop reason: HOSPADM

## 2021-12-09 RX ORDER — DIPHENHYDRAMINE HYDROCHLORIDE 50 MG/ML
12.5 INJECTION INTRAMUSCULAR; INTRAVENOUS
Status: DISCONTINUED | OUTPATIENT
Start: 2021-12-09 | End: 2021-12-09 | Stop reason: HOSPADM

## 2021-12-09 RX ORDER — OXYCODONE HYDROCHLORIDE AND ACETAMINOPHEN 5; 325 MG/1; MG/1
1 TABLET ORAL PRN
Status: DISCONTINUED | OUTPATIENT
Start: 2021-12-09 | End: 2021-12-09 | Stop reason: HOSPADM

## 2021-12-09 RX ADMIN — PROPOFOL INJECTABLE EMULSION 50 MG: 10 INJECTION, EMULSION INTRAVENOUS at 10:34

## 2021-12-09 RX ADMIN — LIDOCAINE HYDROCHLORIDE 50 MG: 10 INJECTION, SOLUTION EPIDURAL; INFILTRATION; INTRACAUDAL; PERINEURAL at 10:26

## 2021-12-09 RX ADMIN — PROPOFOL INJECTABLE EMULSION 30 MG: 10 INJECTION, EMULSION INTRAVENOUS at 10:32

## 2021-12-09 RX ADMIN — PROPOFOL INJECTABLE EMULSION 100 MG: 10 INJECTION, EMULSION INTRAVENOUS at 10:26

## 2021-12-09 RX ADMIN — PROPOFOL 100 MCG/KG/MIN: 10 INJECTION, EMULSION INTRAVENOUS at 10:29

## 2021-12-09 RX ADMIN — PROPOFOL INJECTABLE EMULSION 50 MG: 10 INJECTION, EMULSION INTRAVENOUS at 10:30

## 2021-12-09 RX ADMIN — SODIUM CHLORIDE, POTASSIUM CHLORIDE, SODIUM LACTATE AND CALCIUM CHLORIDE: 600; 310; 30; 20 INJECTION, SOLUTION INTRAVENOUS at 10:10

## 2021-12-09 ASSESSMENT — PULMONARY FUNCTION TESTS
PIF_VALUE: 1
PIF_VALUE: 2
PIF_VALUE: 1

## 2021-12-09 ASSESSMENT — PAIN SCALES - GENERAL: PAINLEVEL_OUTOF10: 0

## 2021-12-09 NOTE — H&P
[address]      Patient's Name/ Date of Birth/ Gender: Carlin Pacheco / 1970 (49 y.o.) / female     Attending physician: Lori Tapia DO    CC: colorectal cancer screening    History of present Illness: Carlin Pacheco is a 49 y.o. female, presents today for colonoscopy for colorectal cancer screening. No prior cscope. Past Medical History:  has no past medical history on file. Past Surgical History:   Past Surgical History:   Procedure Laterality Date    HYSTERECTOMY, TOTAL ABDOMINAL      left ovarier        Social History:  reports that she has never smoked. She has never used smokeless tobacco. She reports current alcohol use. She reports that she does not use drugs. Family History: family history includes Brain Cancer in her father. Review of Systems:   General: Denies fever, chills, night sweats, weight loss, malaise, fatigue  HEENT: Denies sore throat, sinus problems, allergic rhinosinusitis  Card: Denies chest pain, palpitations, orthopnea/PND. Denies h/o murmurs  Pulm: Denies cough, shortness of breath, GARCIA  GI:  per HPI; denies history of constipation, diarrhea, hematochezia or melena  : Denies polyuria, dysuria, hematuria  Endo: Denies diabetes, thyroid problems. Heme: Denies anemia, h/o bleeding or clotting problems. Neuro: Denies h/o CVA, TIA  Skin: Denies rashes, ulcers  Musculoskeletal: Denies muscle, joint, back pain. Allergies: Patient has no known allergies. Current Meds:No current facility-administered medications for this encounter. Current Outpatient Medications:     ciprofloxacin-dexamethasone (CIPRODEX) 0.3-0.1 % otic suspension, Place 4 drops into the right ear 2 times daily for 7 days, Disp: 7.5 mL, Rfl: 0    Sodium Sulfate-Mag Sulfate-KCl 2086-945-454 MG TABS, Take as directed by office. , Disp: 24 tablet, Rfl: 0    phentermine 37.5 MG capsule, Take 1 capsule by mouth every morning for 30 days. , Disp: 30 capsule, Rfl: 0    Acetaminophen (TYLENOL 8 HOUR PO), Take by mouth, Disp: , Rfl:     Fexofenadine HCl (ALLEGRA ALLERGY PO), Take by mouth, Disp: , Rfl:     vitamin C (ASCORBIC ACID) 500 MG tablet, Take 500 mg by mouth daily, Disp: , Rfl:     VITAMIN D PO, Take by mouth, Disp: , Rfl:     Doxylamine Succinate, Sleep, (UNISOM PO), Take by mouth, Disp: , Rfl:     ibuprofen (ADVIL;MOTRIN) 200 MG tablet, Take 200 mg by mouth every 6 hours as needed for Pain, Disp: , Rfl:     Physical Exam:  Vital signs and Nurse's note reviewed  Gen:  A&Ox3, NAD  HEENT: NCAT, PERRLA, EOMI, no scleral icterus, oral mucosa moist  Neck: Trachea midline without obvious masses or lesions  Chest: Symmetric rise with inhalation, no evidence of trauma  CVS: S1S2  Resp: Good bilateral air entry, no audible wheeze or rhonchi  Abd: soft, non-tender, non-distended, no hepatosplenomegaly or palpable masses  Ext: No clubbing, cyanosis, edema, peripheral pulses 2+ Rad/Fem/DP/PT  CNS: Moves all extremities, no gross focal motor deficits  Skin: No erythema or ulcerations         Assessment:    Yanely Fang is a 49 y.o. female presents for colonoscopy for colorectal screening     Plan:    1. To OR for colonoscopy. The risks include bleeding, infection, scarring, perforation, damage to surrounding tissues, need for further surgery in the future. The benefits, alternatives, complications and procedure details were explained to the patient, all questions were answered.           Airam Da Silva DO  12/9/2021

## 2021-12-09 NOTE — OP NOTE
Operative Note      Patient: Paige Yu  YOB: 1970  MRN: [id number]    Date of Procedure: 12/9/2021    Pre-Op Diagnosis: Z12.11 SCREEN    Post-Op Diagnosis: same, internal/external hemorrhoids       Procedure(s):  COLORECTAL CANCER SCREENING, NOT HIGH RISK    Surgeon(s):  Chema Hogan DO    Assistant:   * No surgical staff found *    Anesthesia: Monitor Anesthesia Care    Estimated Blood Loss (mL): none    Complications: None    Specimens:   * No specimens in log *    Implants:  * No implants in log *      Drains: * No LDAs found *    Findings: as above    Detailed Description of Procedure:       INDICATIONS FOR PROCEDURE:   The patient is a 49y.o. year old female with history of above preop diagnosis. Colonoscopy with possible biopsy or polypectomy was recommend, the risk, benefits, expected outcome, and alternatives to the procedure were explained. Risks included but are not limited to bleeding, infection, respiratory distress, hypotension, and perforation of the colon. The patient understands and is in agreement. PROCEDURE DETAILS:  Patient was brought to the endoscopy suite and placed in the left lateral recumbent position. A time out was completed verifying correct patient, procedure and equipment. Anesthesia was induced using MAC guidelines. A digital rectal exam was performed. The colonoscope was inserted into the rectum without difficulty and the scope was advanced to the cecum which was identified by anatomy and by palpation. Bowel prep was adequate. The scope was slowly withdrawn visualizing the cecum, ascending colon, transverse colon, descending colon, sigmoid colon and rectum. The scope was withdrawn to the rectal vault and then retroflexed. The scope was then straightened and removed. The patient tolerated the procedure well and was transferred to the recovery unit in stable condition.     Findings:    Polyps:    none    Other findings:    Internal/external hemorrhoids without complication      Greater than 6 minutes was spent withdrawing the colonoscope. IMPRESSION/PLAN:   1. Increase dietary fiber and water  2. Patient is advised to have a repeat colonoscopy in 10 years   3.  Colonoscopy sooner if blood in the stool, unexplained weight loss, or change in the bowels          Electronically signed by Reji Qureshi DO on 12/9/2021 at 11:13 AM

## 2021-12-09 NOTE — ANESTHESIA PRE PROCEDURE
Department of Anesthesiology  Preprocedure Note       Name:  Concepcion Orosco   Age:  49 y.o.  :  1970                                          MRN:  [id number]         Date:  2021      Surgeon: Sotero Kaufman):  Shawanda Conklin DO    Procedure: Procedure(s):  COLORECTAL CANCER SCREENING, NOT HIGH RISK    Medications prior to admission:   Prior to Admission medications    Medication Sig Start Date End Date Taking? Authorizing Provider   phentermine 37.5 MG capsule Take 1 capsule by mouth every morning for 30 days. 21 Yes Petey Mckenna, APRN - CNP   Acetaminophen (TYLENOL 8 HOUR PO) Take by mouth   Yes Historical Provider, MD   Fexofenadine HCl (ALLEGRA ALLERGY PO) Take by mouth   Yes Historical Provider, MD   vitamin C (ASCORBIC ACID) 500 MG tablet Take 500 mg by mouth daily   Yes Historical Provider, MD   VITAMIN D PO Take by mouth   Yes Historical Provider, MD   Doxylamine Succinate, Sleep, (UNISOM PO) Take by mouth   Yes Historical Provider, MD   ibuprofen (ADVIL;MOTRIN) 200 MG tablet Take 200 mg by mouth every 6 hours as needed for Pain   Yes Historical Provider, MD       Current medications:    Current Facility-Administered Medications   Medication Dose Route Frequency Provider Last Rate Last Admin    lactated ringers infusion   IntraVENous Continuous Bjorn Keen MD        sodium chloride flush 0.9 % injection 10 mL  10 mL IntraVENous 2 times per day Bjorn Keen MD        sodium chloride flush 0.9 % injection 10 mL  10 mL IntraVENous PRN Bjorn Keen MD        0.9 % sodium chloride infusion  25 mL IntraVENous PRN Bjorn Keen MD        lidocaine PF 1 % injection 1 mL  1 mL IntraDERmal Once PRN Bjorn Keen MD           Allergies:  No Known Allergies    Problem List:    Patient Active Problem List   Diagnosis Code    Chronic non-seasonal allergic rhinitis J30.89       Past Medical History:  History reviewed. No pertinent past medical history.     Past Surgical History:        Procedure Laterality Date    HYSTERECTOMY, TOTAL ABDOMINAL      left ovarier        Social History:    Social History     Tobacco Use    Smoking status: Never Smoker    Smokeless tobacco: Never Used   Substance Use Topics    Alcohol use: Yes     Alcohol/week: 0.0 standard drinks                                Counseling given: Not Answered      Vital Signs (Current):   Vitals:    12/09/21 0850   BP: 128/66   Pulse: 66   Resp: 17   Temp: 97 °F (36.1 °C)   SpO2: 99%   Weight: 167 lb (75.8 kg)   Height: 5' 6.5\" (1.689 m)                                              BP Readings from Last 3 Encounters:   12/09/21 128/66   12/03/21 116/68   10/29/21 118/80       NPO Status: Time of last liquid consumption: 2300                        Time of last solid consumption: 2000                        Date of last liquid consumption: 12/08/21                        Date of last solid food consumption: 12/07/21    BMI:   Wt Readings from Last 3 Encounters:   12/09/21 167 lb (75.8 kg)   12/03/21 166 lb 12.8 oz (75.7 kg)   10/29/21 171 lb 9.6 oz (77.8 kg)     Body mass index is 26.55 kg/m². CBC:   Lab Results   Component Value Date    WBC 4.8 07/15/2021    RBC 4.25 07/15/2021    HGB 13.1 07/15/2021    HCT 41.1 07/15/2021    MCV 96.7 07/15/2021    RDW 12.7 07/15/2021     07/15/2021       CMP:   Lab Results   Component Value Date     07/15/2021    K 4.4 07/15/2021     07/15/2021    CO2 24 07/15/2021    BUN 15 07/15/2021    CREATININE 0.54 07/15/2021    GFRAA >60 07/15/2021    LABGLOM >60 07/15/2021    GLUCOSE 88 07/15/2021    PROT 6.8 07/15/2021    CALCIUM 9.3 07/15/2021    BILITOT 0.34 07/15/2021    ALKPHOS 66 07/15/2021    AST 22 07/15/2021    ALT 22 07/15/2021       POC Tests: No results for input(s): POCGLU, POCNA, POCK, POCCL, POCBUN, POCHEMO, POCHCT in the last 72 hours.     Coags: No results found for: PROTIME, INR, APTT    HCG (If Applicable): No results found for: PREGTESTUR, PREGSERUM, HCG, HCGQUANT     ABGs: No results found for: PHART, PO2ART, EUF9FFU, TSR5OMG, BEART, G2BBJOLL     Type & Screen (If Applicable):  No results found for: LABABO, LABRH    Drug/Infectious Status (If Applicable):  Lab Results   Component Value Date    HEPCAB NONREACTIVE 07/15/2021       COVID-19 Screening (If Applicable):   Lab Results   Component Value Date    COVID19 Not Detected 12/05/2021           Anesthesia Evaluation  Patient summary reviewed and Nursing notes reviewed  Airway: Mallampati: III  TM distance: >3 FB   Neck ROM: full  Mouth opening: > = 3 FB Dental: normal exam         Pulmonary:Negative Pulmonary ROS and normal exam                               Cardiovascular:Negative CV ROS                      Neuro/Psych:   Negative Neuro/Psych ROS              GI/Hepatic/Renal: Neg GI/Hepatic/Renal ROS            Endo/Other: Negative Endo/Other ROS                     ROS comment: -NPO AFTER MIDNIGHT  -NKDA Abdominal:             Vascular: negative vascular ROS. Other Findings:             Anesthesia Plan      MAC     ASA 1       Induction: intravenous. MIPS: Postoperative opioids intended and Prophylactic antiemetics administered. Anesthetic plan and risks discussed with patient. Plan discussed with CRNA.     Attending anesthesiologist reviewed and agrees with Abraham Mccray MD   12/9/2021

## 2021-12-09 NOTE — ANESTHESIA POSTPROCEDURE EVALUATION
Department of Anesthesiology  Postprocedure Note    Patient: Zabrina Elizabeth  MRN: [id number]  YOB: 1970  Date of evaluation: 12/9/2021  Time:  11:08 AM     Procedure Summary     Date: 12/09/21 Room / Location: [address] [place] [address] N / [address] [facility]    Anesthesia Start: 2018 Anesthesia Stop: 0748    Procedure: COLORECTAL CANCER SCREENING, NOT HIGH RISK (N/A ) Diagnosis: (Z12.11 SCREEN)    Surgeons: Baljeet Griggs DO Responsible Provider: Luther Arzola MD    Anesthesia Type: MAC ASA Status: 1          Anesthesia Type: MAC    Fermin Phase I: Fermin Score: 10    Fermin Phase II:      Last vitals: Reviewed and per EMR flowsheets.        Anesthesia Post Evaluation    Patient location during evaluation: PACU  Patient participation: complete - patient participated  Level of consciousness: awake and alert  Airway patency: patent  Nausea & Vomiting: no nausea and no vomiting  Complications: no  Cardiovascular status: hemodynamically stable  Respiratory status: nasal cannula and spontaneous ventilation  Hydration status: euvolemic  Multimodal analgesia pain management approach

## 2022-01-06 ENCOUNTER — PATIENT MESSAGE (OUTPATIENT)
Dept: ORTHOPEDIC SURGERY | Age: 52
End: 2022-01-06

## 2022-07-21 ENCOUNTER — HOSPITAL ENCOUNTER (OUTPATIENT)
Age: 52
Setting detail: SPECIMEN
Discharge: HOME | End: 2022-07-21

## 2022-07-21 ENCOUNTER — OFFICE VISIT (OUTPATIENT)
Dept: PRIMARY CARE CLINIC | Age: 52
End: 2022-07-21
Payer: COMMERCIAL

## 2022-07-21 VITALS
RESPIRATION RATE: 12 BRPM | DIASTOLIC BLOOD PRESSURE: 74 MMHG | WEIGHT: 178.8 LBS | HEART RATE: 51 BPM | HEIGHT: 67 IN | BODY MASS INDEX: 28.06 KG/M2 | OXYGEN SATURATION: 97 % | SYSTOLIC BLOOD PRESSURE: 112 MMHG

## 2022-07-21 DIAGNOSIS — Z13.1 SCREENING FOR DIABETES MELLITUS: ICD-10-CM

## 2022-07-21 DIAGNOSIS — Z00.00 ENCOUNTER FOR GENERAL ADULT MEDICAL EXAMINATION W/O ABNORMAL FINDINGS: Primary | ICD-10-CM

## 2022-07-21 DIAGNOSIS — Z13.0 SCREENING FOR DEFICIENCY ANEMIA: ICD-10-CM

## 2022-07-21 DIAGNOSIS — Z13.220 SCREENING FOR LIPID DISORDERS: ICD-10-CM

## 2022-07-21 DIAGNOSIS — Z12.31 VISIT FOR SCREENING MAMMOGRAM: ICD-10-CM

## 2022-07-21 DIAGNOSIS — Z13.29 SCREENING FOR THYROID DISORDER: ICD-10-CM

## 2022-07-21 LAB
ALBUMIN SERPL-MCNC: 4.8 G/DL (ref 3.5–5.2)
ALBUMIN/GLOBULIN RATIO: 2.2 (ref 1–2.5)
ALP BLD-CCNC: 61 U/L (ref 35–104)
ALT SERPL-CCNC: 24 U/L (ref 5–33)
ANION GAP SERPL CALCULATED.3IONS-SCNC: 12 MMOL/L (ref 9–17)
AST SERPL-CCNC: 29 U/L
BILIRUB SERPL-MCNC: 0.46 MG/DL (ref 0.3–1.2)
BUN BLDV-MCNC: 13 MG/DL (ref 6–20)
CALCIUM SERPL-MCNC: 10.3 MG/DL (ref 8.6–10.4)
CHLORIDE BLD-SCNC: 104 MMOL/L (ref 98–107)
CHOLESTEROL/HDL RATIO: 4.2
CHOLESTEROL: 209 MG/DL
CO2: 26 MMOL/L (ref 20–31)
CREAT SERPL-MCNC: 0.69 MG/DL (ref 0.5–0.9)
FOLLICLE STIMULATING HORMONE: 81.8 MIU/ML (ref 1.7–21.5)
GFR AFRICAN AMERICAN: >60 ML/MIN
GFR NON-AFRICAN AMERICAN: >60 ML/MIN
GFR SERPL CREATININE-BSD FRML MDRD: NORMAL ML/MIN/{1.73_M2}
GLUCOSE BLD-MCNC: 95 MG/DL (ref 70–99)
HCT VFR BLD CALC: 41 % (ref 36.3–47.1)
HDLC SERPL-MCNC: 50 MG/DL
HEMOGLOBIN: 13 G/DL (ref 11.9–15.1)
LDL CHOLESTEROL: 139 MG/DL (ref 0–130)
MCH RBC QN AUTO: 31.5 PG (ref 25.2–33.5)
MCHC RBC AUTO-ENTMCNC: 31.7 G/DL (ref 28.4–34.8)
MCV RBC AUTO: 99.3 FL (ref 82.6–102.9)
NRBC AUTOMATED: 0 PER 100 WBC
PDW BLD-RTO: 12.5 % (ref 11.8–14.4)
PLATELET # BLD: 237 K/UL (ref 138–453)
PMV BLD AUTO: 11.5 FL (ref 8.1–13.5)
POTASSIUM SERPL-SCNC: 5 MMOL/L (ref 3.7–5.3)
RBC # BLD: 4.13 M/UL (ref 3.95–5.11)
SODIUM BLD-SCNC: 142 MMOL/L (ref 135–144)
TOTAL PROTEIN: 7 G/DL (ref 6.4–8.3)
TRIGL SERPL-MCNC: 99 MG/DL
TSH SERPL DL<=0.05 MIU/L-ACNC: 1.41 UIU/ML (ref 0.3–5)
WBC # BLD: 5.7 K/UL (ref 3.5–11.3)

## 2022-07-21 PROCEDURE — 99396 PREV VISIT EST AGE 40-64: CPT | Performed by: NURSE PRACTITIONER

## 2022-07-21 SDOH — ECONOMIC STABILITY: FOOD INSECURITY: WITHIN THE PAST 12 MONTHS, YOU WORRIED THAT YOUR FOOD WOULD RUN OUT BEFORE YOU GOT MONEY TO BUY MORE.: NEVER TRUE

## 2022-07-21 SDOH — ECONOMIC STABILITY: FOOD INSECURITY: WITHIN THE PAST 12 MONTHS, THE FOOD YOU BOUGHT JUST DIDN'T LAST AND YOU DIDN'T HAVE MONEY TO GET MORE.: NEVER TRUE

## 2022-07-21 ASSESSMENT — PATIENT HEALTH QUESTIONNAIRE - PHQ9
SUM OF ALL RESPONSES TO PHQ QUESTIONS 1-9: 0
SUM OF ALL RESPONSES TO PHQ9 QUESTIONS 1 & 2: 0
SUM OF ALL RESPONSES TO PHQ QUESTIONS 1-9: 0
SUM OF ALL RESPONSES TO PHQ QUESTIONS 1-9: 0
1. LITTLE INTEREST OR PLEASURE IN DOING THINGS: 0
SUM OF ALL RESPONSES TO PHQ QUESTIONS 1-9: 0
2. FEELING DOWN, DEPRESSED OR HOPELESS: 0

## 2022-07-21 ASSESSMENT — ENCOUNTER SYMPTOMS
BACK PAIN: 0
ABDOMINAL PAIN: 0
SHORTNESS OF BREATH: 0
COUGH: 0

## 2022-07-21 ASSESSMENT — SOCIAL DETERMINANTS OF HEALTH (SDOH): HOW HARD IS IT FOR YOU TO PAY FOR THE VERY BASICS LIKE FOOD, HOUSING, MEDICAL CARE, AND HEATING?: NOT HARD AT ALL

## 2022-07-21 NOTE — PROGRESS NOTES
[facility] PRIMARY CARE  [facility] [address]  [address] [address]  Dept: [phone]  Dept Fax: [phone]    Allie Garcia is a 49 y.o. female who presentstoday for her medical conditions/complaints as noted below. Allie Garcia is c/o of  Chief Complaint   Patient presents with    Annual Exam           HPI:     Presents for annual exam  BP well controlled  Has gained 12lb since LOV  Will be working on diet/exercise    Bilateral hip pain improved  Chronic lower back pain that comes/goes    Willing to update annual labs  Mammogram completed in care everywhere    Denies any other problems/concerns      Hemoglobin A1C (%)   Date Value   07/15/2021 5.2             ( goal A1C is < 7)   No results found for: LABMICR  LDL Cholesterol (mg/dL)   Date Value   07/15/2021 128   08/28/2020 135 (H)   08/22/2019 113     LDL Calculated (mg/dL)   Date Value   05/23/2013 117       (goal LDL is <100)   AST (U/L)   Date Value   07/15/2021 22     ALT (U/L)   Date Value   07/15/2021 22     BUN (mg/dL)   Date Value   07/15/2021 15     BP Readings from Last 3 Encounters:   07/21/22 112/74   12/09/21 (!) 162/89   12/09/21 (!) 145/83          ([id number])    History reviewed. No pertinent past medical history. Past Surgical History:   Procedure Laterality Date    COLONOSCOPY  12/09/2021    COLONOSCOPY N/A 12/9/2021    COLORECTAL CANCER SCREENING, NOT HIGH RISK performed by Dara Olszewski, DO at [facility], TOTAL ABDOMINAL (CERVIX REMOVED)      left ovarier        Family History   Problem Relation Age of Onset    Brain Cancer Father           Social History     Tobacco Use    Smoking status: Never    Smokeless tobacco: Never   Substance Use Topics    Alcohol use:  Yes     Alcohol/week: 0.0 standard drinks      Current Outpatient Medications   Medication Sig Dispense Refill    Acetaminophen (TYLENOL 8 HOUR PO) Take by mouth      Fexofenadine HCl (ALLEGRA ALLERGY PO) Take by mouth      vitamin C (ASCORBIC ACID) 500 MG tablet Take 500 mg by mouth daily      VITAMIN D PO Take by mouth      Doxylamine Succinate, Sleep, (UNISOM PO) Take by mouth      ibuprofen (ADVIL;MOTRIN) 200 MG tablet Take 200 mg by mouth every 6 hours as needed for Pain       No current facility-administered medications for this visit. No Known Allergies    Health Maintenance   Topic Date Due    Shingles vaccine (1 of 2) Never done    COVID-19 Vaccine (4 - Booster for Moderna series) 04/11/2022    Flu vaccine (1) 09/01/2022    Depression Screen  12/03/2022    Breast cancer screen  01/28/2023    Lipids  07/15/2026    DTaP/Tdap/Td vaccine (2 - Td or Tdap) 07/05/2028    Colorectal Cancer Screen  12/09/2031    Hepatitis C screen  Completed    HIV screen  Completed    Hepatitis A vaccine  Aged Out    Hepatitis B vaccine  Aged Out    Hib vaccine  Aged Out    Meningococcal (ACWY) vaccine  Aged Out    Pneumococcal 0-64 years Vaccine  Aged Out       Subjective:      Review of Systems   Constitutional:  Negative for chills, fatigue and fever. HENT:  Negative for congestion. Eyes:  Negative for visual disturbance. Respiratory:  Negative for cough and shortness of breath. Cardiovascular:  Negative for chest pain and palpitations. Gastrointestinal:  Negative for abdominal pain. Genitourinary:  Negative for dysuria. Musculoskeletal:  Positive for arthralgias. Negative for back pain. Neurological:  Negative for dizziness, numbness and headaches. Psychiatric/Behavioral:  Negative for self-injury, sleep disturbance and suicidal ideas. The patient is not nervous/anxious. Objective:     Physical Exam  Vitals and nursing note reviewed. Constitutional:       Appearance: She is well-developed. HENT:      Head: Normocephalic and atraumatic. Eyes:      Pupils: Pupils are equal, round, and reactive to light. Cardiovascular:      Rate and Rhythm: Normal rate and regular rhythm.       Heart sounds: Normal heart sounds. Pulmonary:      Effort: Pulmonary effort is normal.      Breath sounds: Normal breath sounds. Abdominal:      General: Bowel sounds are normal.      Palpations: Abdomen is soft. Tenderness: There is no abdominal tenderness. Musculoskeletal:         General: Normal range of motion. Cervical back: Normal range of motion and neck supple. Skin:     General: Skin is warm and dry. Neurological:      Mental Status: She is alert and oriented to person, place, and time. Psychiatric:         Behavior: Behavior normal.         Thought Content: Thought content normal.         Judgment: Judgment normal.     /74   Pulse 51   Resp 12   Ht 5' 6.5\" (1.689 m)   Wt 178 lb 12.8 oz (81.1 kg)   SpO2 97%   BMI 28.43 kg/m²     Assessment:       Diagnosis Orders   1. Encounter for general adult medical examination w/o abnormal findings        2. Screening for deficiency anemia  CBC      3. Screening for lipid disorders  Lipid Panel      4. Screening for thyroid disorder  TSH with Reflex      5. Screening for diabetes mellitus  Comprehensive Metabolic Panel    Hemoglobin A1C      6. Visit for screening mammogram  PRABHAKAR DIGITAL SCREEN W OR WO CAD BILATERAL                Plan:      Return in about 1 year (around 7/21/2023) for annual exam.    HM- Continue diet/exercise. Rx given for annual labs. Completed form for work. Follow up in one year/earlier if needed.     Orders Placed This Encounter   Procedures    PRABHAKAR DIGITAL SCREEN W OR WO CAD BILATERAL     Standing Status:   Future     Standing Expiration Date:   7/21/2023     Order Specific Question:   Reason for exam:     Answer:   screening    CBC     Standing Status:   Future     Standing Expiration Date:   7/21/2023    Lipid Panel     Standing Status:   Future     Standing Expiration Date:   7/21/2023     Order Specific Question:   Is Patient Fasting?/# of Hours     Answer:   12    TSH with Reflex     Standing Status:   Future     Standing Expiration Date:   7/21/2023    Comprehensive Metabolic Panel     Standing Status:   Future     Standing Expiration Date:   7/21/2023    Hemoglobin A1C     Standing Status:   Future     Standing Expiration Date:   7/21/2023      No orders of the defined types were placed in this encounter. Patient given educational materials - see patient instructions. Discussed use, benefit, and side effects of prescribed medications. All patientquestions answered. Pt voiced understanding. Reviewed health maintenance. Instructedto continue current medications, diet and exercise. Patient agreed with treatmentplan. Follow up as directed.      Electronicallysigned by JOHN Vazquez CNP on 7/21/2022 at 8:25 AM

## 2022-07-22 ENCOUNTER — TELEPHONE (OUTPATIENT)
Dept: PRIMARY CARE CLINIC | Age: 52
End: 2022-07-22

## 2022-07-22 LAB
ESTIMATED AVERAGE GLUCOSE: 97 MG/DL
HBA1C MFR BLD: 5 % (ref 4–6)

## 2022-07-22 NOTE — TELEPHONE ENCOUNTER
Patient's physical form was faxed over. Writer placed up front for pickup.     Writer left voicemail notifying patient form is up front

## 2022-07-31 RX ORDER — SCOLOPAMINE TRANSDERMAL SYSTEM 1 MG/1
1 PATCH, EXTENDED RELEASE TRANSDERMAL
Qty: 10 PATCH | Refills: 0 | Status: SHIPPED | OUTPATIENT
Start: 2022-07-31 | End: 2022-08-10 | Stop reason: SDUPTHER

## 2022-08-10 ENCOUNTER — PATIENT MESSAGE (OUTPATIENT)
Dept: PRIMARY CARE CLINIC | Age: 52
End: 2022-08-10

## 2022-08-10 DIAGNOSIS — E66.9 OBESITY WITH SERIOUS COMORBIDITY, UNSPECIFIED CLASSIFICATION, UNSPECIFIED OBESITY TYPE: Primary | ICD-10-CM

## 2022-08-10 RX ORDER — SCOLOPAMINE TRANSDERMAL SYSTEM 1 MG/1
1 PATCH, EXTENDED RELEASE TRANSDERMAL
Qty: 10 PATCH | Refills: 0 | Status: SHIPPED | OUTPATIENT
Start: 2022-08-10 | End: 2023-08-10

## 2022-08-10 RX ORDER — PHENTERMINE HYDROCHLORIDE 37.5 MG/1
37.5 CAPSULE ORAL EVERY MORNING
Qty: 30 CAPSULE | Refills: 0 | Status: SHIPPED | OUTPATIENT
Start: 2022-08-10 | End: 2022-09-08 | Stop reason: SDUPTHER

## 2022-09-08 ENCOUNTER — OFFICE VISIT (OUTPATIENT)
Dept: PRIMARY CARE CLINIC | Age: 52
End: 2022-09-08
Payer: COMMERCIAL

## 2022-09-08 VITALS
RESPIRATION RATE: 12 BRPM | HEART RATE: 65 BPM | HEIGHT: 67 IN | SYSTOLIC BLOOD PRESSURE: 110 MMHG | OXYGEN SATURATION: 96 % | DIASTOLIC BLOOD PRESSURE: 76 MMHG | BODY MASS INDEX: 28.22 KG/M2 | WEIGHT: 179.8 LBS

## 2022-09-08 DIAGNOSIS — J06.9 UPPER RESPIRATORY TRACT INFECTION, UNSPECIFIED TYPE: Primary | ICD-10-CM

## 2022-09-08 DIAGNOSIS — E66.9 OBESITY WITH SERIOUS COMORBIDITY, UNSPECIFIED CLASSIFICATION, UNSPECIFIED OBESITY TYPE: ICD-10-CM

## 2022-09-08 PROCEDURE — 99214 OFFICE O/P EST MOD 30 MIN: CPT | Performed by: NURSE PRACTITIONER

## 2022-09-08 RX ORDER — PHENTERMINE HYDROCHLORIDE 37.5 MG/1
37.5 CAPSULE ORAL EVERY MORNING
Qty: 30 CAPSULE | Refills: 0 | Status: SHIPPED | OUTPATIENT
Start: 2022-09-08 | End: 2022-10-06 | Stop reason: SDUPTHER

## 2022-09-08 RX ORDER — AZITHROMYCIN 250 MG/1
TABLET, FILM COATED ORAL
Qty: 6 TABLET | Refills: 0 | Status: SHIPPED | OUTPATIENT
Start: 2022-09-08 | End: 2022-09-18

## 2022-09-08 ASSESSMENT — PATIENT HEALTH QUESTIONNAIRE - PHQ9
SUM OF ALL RESPONSES TO PHQ QUESTIONS 1-9: 0
2. FEELING DOWN, DEPRESSED OR HOPELESS: 0
SUM OF ALL RESPONSES TO PHQ9 QUESTIONS 1 & 2: 0
SUM OF ALL RESPONSES TO PHQ QUESTIONS 1-9: 0
SUM OF ALL RESPONSES TO PHQ QUESTIONS 1-9: 0
1. LITTLE INTEREST OR PLEASURE IN DOING THINGS: 0
SUM OF ALL RESPONSES TO PHQ QUESTIONS 1-9: 0

## 2022-09-08 ASSESSMENT — ENCOUNTER SYMPTOMS
SHORTNESS OF BREATH: 0
BACK PAIN: 0
COUGH: 0
SINUS PRESSURE: 1
SINUS PAIN: 1
SORE THROAT: 1
ABDOMINAL PAIN: 0

## 2022-09-08 NOTE — PROGRESS NOTES
[facility] PRIMARY CARE  [facility] [address] [facility] [address]  [address] [address]  Dept: [phone]  Dept Fax: [phone]    Davied Gosselin is a 49 y.o. female who presentstoday for her medical conditions/complaints as noted below. Davied Gosselin is c/o of  Chief Complaint   Patient presents with    Obesity    1 Month Follow-Up    Sinus Problem           HPI:     Presents for one month recheck  BP well controlled  Has lost 5lb per patient since starting adipex    Has been using adipex   Feels it helps curb her appetite   Plans to add exercise as well  Denies any side effects with use of med    C/o headache, sinus pain, fatigue, sore throat since Friday  Negative covid test x 2 at home     Denies any other problems/concerns      Hemoglobin A1C (%)   Date Value   2022 5.0   07/15/2021 5.2             ( goal A1C is < 7)   No results found for: LABMICR  LDL Cholesterol (mg/dL)   Date Value   2022 139 (H)   07/15/2021 128   2020 135 (H)     LDL Calculated (mg/dL)   Date Value   2013 117       (goal LDL is <100)   AST (U/L)   Date Value   2022 29     ALT (U/L)   Date Value   2022 24     BUN (mg/dL)   Date Value   2022 13     BP Readings from Last 3 Encounters:   22 110/76   22 112/74   21 (!) 162/89          ([id number])    History reviewed. No pertinent past medical history. Past Surgical History:   Procedure Laterality Date    COLONOSCOPY  2021    COLONOSCOPY N/A 2021    COLORECTAL CANCER SCREENING, NOT HIGH RISK performed by Awa Mccollum DO at [facility], TOTAL ABDOMINAL (CERVIX REMOVED)      left ovarier        Family History   Problem Relation Age of Onset    Brain Cancer Father           Social History     Tobacco Use    Smoking status: Never    Smokeless tobacco: Never   Substance Use Topics    Alcohol use:  Yes     Alcohol/week: 0.0 standard drinks      Current Outpatient Medications   Medication Sig Dispense Refill    phentermine 37.5 MG capsule Take 1 capsule by mouth every morning for 30 days. 30 capsule 0    azithromycin (ZITHROMAX) 250 MG tablet 2 tablets by mouth on day one. Then, 1 tablet by mouth daily for days 2-5. 6 tablet 0    scopolamine (TRANSDERM-SCOP, 1.5 MG,) transdermal patch Place 1 patch onto the skin every 72 hours 10 patch 0    Acetaminophen (TYLENOL 8 HOUR PO) Take by mouth      Fexofenadine HCl (ALLEGRA ALLERGY PO) Take by mouth      vitamin C (ASCORBIC ACID) 500 MG tablet Take 500 mg by mouth daily      VITAMIN D PO Take by mouth      Doxylamine Succinate, Sleep, (UNISOM PO) Take by mouth      ibuprofen (ADVIL;MOTRIN) 200 MG tablet Take 200 mg by mouth every 6 hours as needed for Pain       No current facility-administered medications for this visit. No Known Allergies    Health Maintenance   Topic Date Due    Shingles vaccine (1 of 2) Never done    COVID-19 Vaccine (4 - Booster for Moderna series) 04/11/2022    Flu vaccine (1) 09/01/2022    Breast cancer screen  01/28/2023    Depression Screen  07/21/2023    Lipids  07/21/2027    DTaP/Tdap/Td vaccine (2 - Td or Tdap) 07/05/2028    Colorectal Cancer Screen  12/09/2031    Hepatitis C screen  Completed    HIV screen  Completed    Hepatitis A vaccine  Aged Out    Hepatitis B vaccine  Aged Out    Hib vaccine  Aged Out    Meningococcal (ACWY) vaccine  Aged Out    Pneumococcal 0-64 years Vaccine  Aged Out       Subjective:      Review of Systems   Constitutional:  Negative for chills, fatigue and fever. HENT:  Positive for congestion, sinus pressure, sinus pain and sore throat. Eyes:  Negative for visual disturbance. Respiratory:  Negative for cough and shortness of breath. Cardiovascular:  Negative for chest pain and palpitations. Gastrointestinal:  Negative for abdominal pain. Genitourinary:  Negative for dysuria. Musculoskeletal:  Negative for back pain.    Neurological:  Positive for headaches. Negative for dizziness and numbness. Psychiatric/Behavioral:  Negative for self-injury, sleep disturbance and suicidal ideas. The patient is not nervous/anxious. Objective:     Physical Exam  Vitals and nursing note reviewed. Constitutional:       Appearance: She is well-developed. HENT:      Head: Normocephalic and atraumatic. Eyes:      Pupils: Pupils are equal, round, and reactive to light. Cardiovascular:      Rate and Rhythm: Normal rate and regular rhythm. Heart sounds: Normal heart sounds. Pulmonary:      Effort: Pulmonary effort is normal.      Breath sounds: Normal breath sounds. Abdominal:      General: Bowel sounds are normal.      Palpations: Abdomen is soft. Tenderness: There is no abdominal tenderness. Musculoskeletal:         General: Normal range of motion. Cervical back: Normal range of motion and neck supple. Skin:     General: Skin is warm and dry. Neurological:      Mental Status: She is alert and oriented to person, place, and time. Psychiatric:         Behavior: Behavior normal.         Thought Content: Thought content normal.         Judgment: Judgment normal.     /76   Pulse 65   Resp 12   Ht 5' 6.5\" (1.689 m)   Wt 179 lb 12.8 oz (81.6 kg)   SpO2 96%   BMI 28.59 kg/m²     Assessment:       Diagnosis Orders   1. Upper respiratory tract infection, unspecified type  azithromycin (ZITHROMAX) 250 MG tablet      2. Obesity with serious comorbidity, unspecified classification, unspecified obesity type  phentermine 37.5 MG capsule                Plan:      Return in about 1 month (around 10/8/2022) for recheck. URI-Rx given for zpack, follow up if no improvement in symptoms with use of antibiotic. Obesity- Continue diet/exercise. Rx renewed for adipex. Follow up in one month for recheck. Orders Placed This Encounter   Medications    phentermine 37.5 MG capsule     Sig: Take 1 capsule by mouth every morning for 30 days. Dispense:  30 capsule     Refill:  0    azithromycin (ZITHROMAX) 250 MG tablet     Si tablets by mouth on day one. Then, 1 tablet by mouth daily for days 2-5. Dispense:  6 tablet     Refill:  0         Patient given educational materials - see patient instructions. Discussed use, benefit, and side effects of prescribed medications. All patientquestions answered. Pt voiced understanding. Reviewed health maintenance. Instructedto continue current medications, diet and exercise. Patient agreed with treatmentplan. Follow up as directed.      Electronicallysigned by JOHN Bermudez CNP on 2022 at 11:54 AM

## 2022-10-06 ENCOUNTER — OFFICE VISIT (OUTPATIENT)
Dept: PRIMARY CARE CLINIC | Age: 52
End: 2022-10-06
Payer: COMMERCIAL

## 2022-10-06 VITALS
DIASTOLIC BLOOD PRESSURE: 74 MMHG | WEIGHT: 173 LBS | RESPIRATION RATE: 12 BRPM | SYSTOLIC BLOOD PRESSURE: 122 MMHG | HEART RATE: 65 BPM | OXYGEN SATURATION: 100 % | BODY MASS INDEX: 27.15 KG/M2 | HEIGHT: 67 IN

## 2022-10-06 DIAGNOSIS — E66.9 OBESITY WITH SERIOUS COMORBIDITY, UNSPECIFIED CLASSIFICATION, UNSPECIFIED OBESITY TYPE: ICD-10-CM

## 2022-10-06 DIAGNOSIS — J30.89 CHRONIC NON-SEASONAL ALLERGIC RHINITIS: Primary | ICD-10-CM

## 2022-10-06 PROCEDURE — 99214 OFFICE O/P EST MOD 30 MIN: CPT | Performed by: NURSE PRACTITIONER

## 2022-10-06 RX ORDER — PHENTERMINE HYDROCHLORIDE 37.5 MG/1
37.5 CAPSULE ORAL EVERY MORNING
Qty: 30 CAPSULE | Refills: 0 | Status: SHIPPED | OUTPATIENT
Start: 2022-10-06 | End: 2022-11-05

## 2022-10-06 ASSESSMENT — PATIENT HEALTH QUESTIONNAIRE - PHQ9
2. FEELING DOWN, DEPRESSED OR HOPELESS: 0
SUM OF ALL RESPONSES TO PHQ QUESTIONS 1-9: 0
SUM OF ALL RESPONSES TO PHQ9 QUESTIONS 1 & 2: 0
1. LITTLE INTEREST OR PLEASURE IN DOING THINGS: 0

## 2022-10-06 ASSESSMENT — ENCOUNTER SYMPTOMS
SHORTNESS OF BREATH: 0
COUGH: 0
BACK PAIN: 0
ABDOMINAL PAIN: 0

## 2022-10-06 NOTE — PROGRESS NOTES
[facility] PRIMARY CARE  [address] [facility] [address]  [address] [address]  Dept: [phone]  Dept Fax: [phone]    Yolanda Haskins is a 49 y.o. female who presentstoday for her medical conditions/complaints as noted below. Yolanda Haskins is c/o of  Chief Complaint   Patient presents with    1 Month Follow-Up    Obesity         HPI:     Presents for one month recheck  BP well controlled  Has lost 6lb since LOV- congratulated patient  Continues to work on diet/exercise    Using adipex daily, feels like it helps to decrease appetite  Denies any side effects with use of med    Chronic allergies controlled with use of zyrtec    Denies any other problems/concerns      Hemoglobin A1C (%)   Date Value   2022 5.0   07/15/2021 5.2             ( goal A1C is < 7)   No results found for: LABMICR  LDL Cholesterol (mg/dL)   Date Value   2022 139 (H)   07/15/2021 128   2020 135 (H)     LDL Calculated (mg/dL)   Date Value   2013 117       (goal LDL is <100)   AST (U/L)   Date Value   2022 29     ALT (U/L)   Date Value   2022 24     BUN (mg/dL)   Date Value   2022 13     BP Readings from Last 3 Encounters:   10/06/22 122/74   22 110/76   22 112/74          ([id number])    History reviewed. No pertinent past medical history. Past Surgical History:   Procedure Laterality Date    COLONOSCOPY  2021    COLONOSCOPY N/A 2021    COLORECTAL CANCER SCREENING, NOT HIGH RISK performed by Alirio Edmonds DO at [facility] Elsie [place] 50, TOTAL ABDOMINAL (CERVIX REMOVED)      left ovarier        Family History   Problem Relation Age of Onset    Brain Cancer Father           Social History     Tobacco Use    Smoking status: Never    Smokeless tobacco: Never   Substance Use Topics    Alcohol use:  Yes     Alcohol/week: 0.0 standard drinks      Current Outpatient Medications   Medication Sig Dispense Refill    phentermine 37.5 MG capsule Take 1 capsule by mouth every morning for 30 days. 30 capsule 0    scopolamine (TRANSDERM-SCOP, 1.5 MG,) transdermal patch Place 1 patch onto the skin every 72 hours 10 patch 0    Acetaminophen (TYLENOL 8 HOUR PO) Take by mouth      Fexofenadine HCl (ALLEGRA ALLERGY PO) Take by mouth      vitamin C (ASCORBIC ACID) 500 MG tablet Take 500 mg by mouth daily      VITAMIN D PO Take by mouth      Doxylamine Succinate, Sleep, (UNISOM PO) Take by mouth      ibuprofen (ADVIL;MOTRIN) 200 MG tablet Take 200 mg by mouth every 6 hours as needed for Pain       No current facility-administered medications for this visit. No Known Allergies    Health Maintenance   Topic Date Due    Cervical cancer screen  Never done    Shingles vaccine (1 of 2) Never done    COVID-19 Vaccine (4 - Booster for Moderna series) 04/11/2022    Breast cancer screen  01/28/2023    Depression Screen  09/08/2023    Lipids  07/21/2027    DTaP/Tdap/Td vaccine (2 - Td or Tdap) 07/05/2028    Colorectal Cancer Screen  12/09/2031    Flu vaccine  Completed    Hepatitis C screen  Completed    HIV screen  Completed    Hepatitis A vaccine  Aged Out    Hepatitis B vaccine  Aged Out    Hib vaccine  Aged Out    Meningococcal (ACWY) vaccine  Aged Out    Pneumococcal 0-64 years Vaccine  Aged Out       Subjective:      Review of Systems   Constitutional:  Negative for chills, fatigue and fever. HENT:  Negative for congestion. Respiratory:  Negative for cough and shortness of breath. Cardiovascular:  Negative for chest pain and palpitations. Gastrointestinal:  Negative for abdominal pain. Genitourinary:  Negative for dysuria. Musculoskeletal:  Negative for back pain. Neurological:  Negative for dizziness and numbness. Psychiatric/Behavioral:  Negative for self-injury, sleep disturbance and suicidal ideas. The patient is not nervous/anxious. Objective:     Physical Exam  Vitals and nursing note reviewed.    Constitutional:       Appearance: She is well-developed. HENT:      Head: Normocephalic and atraumatic. Eyes:      Pupils: Pupils are equal, round, and reactive to light. Cardiovascular:      Rate and Rhythm: Normal rate and regular rhythm. Heart sounds: Normal heart sounds. Pulmonary:      Effort: Pulmonary effort is normal.      Breath sounds: Normal breath sounds. Abdominal:      General: Bowel sounds are normal.      Palpations: Abdomen is soft. Tenderness: There is no abdominal tenderness. Musculoskeletal:         General: Normal range of motion. Cervical back: Normal range of motion and neck supple. Skin:     General: Skin is warm and dry. Neurological:      Mental Status: She is alert and oriented to person, place, and time. Psychiatric:         Behavior: Behavior normal.         Thought Content: Thought content normal.         Judgment: Judgment normal.   /74   Pulse 65   Resp 12   Ht 5' 6.5\" (1.689 m)   Wt 173 lb (78.5 kg)   SpO2 100%   BMI 27.50 kg/m²     Assessment:       Diagnosis Orders   1. Chronic non-seasonal allergic rhinitis        2. Obesity with serious comorbidity, unspecified classification, unspecified obesity type  phentermine 37.5 MG capsule                Plan:      Return in about 1 year (around 10/6/2023) for annual exam.    Chronic conditions- Stable. Continue diet/exercise. Renewed adipex for 3rd/final month. Follow up in one year for annual exam or earlier if needed. Orders Placed This Encounter   Medications    phentermine 37.5 MG capsule     Sig: Take 1 capsule by mouth every morning for 30 days. Dispense:  30 capsule     Refill:  0       Patient given educational materials - see patient instructions. Discussed use, benefit, and side effects of prescribed medications. All patientquestions answered. Pt voiced understanding. Reviewed health maintenance. Instructedto continue current medications, diet and exercise. Patient agreed with treatmentplan.  Follow up as directed.      Electronicallysigned by BeVocal, JOHN - CNP on 10/6/2022 at 2:26 PM

## 2022-10-27 ENCOUNTER — PATIENT MESSAGE (OUTPATIENT)
Dept: PRIMARY CARE CLINIC | Age: 52
End: 2022-10-27

## 2022-10-27 NOTE — TELEPHONE ENCOUNTER
From: Yolanda Haskins  To: Rhiannon Greenberg  Sent: 10/27/2022 5:08 PM EDT  Subject: Rahul Sanchez got my shot today 10/27/22. I just wanted to let  you know for my immunization records.     Thank,  Byron Self

## 2022-12-13 ENCOUNTER — OFFICE VISIT (OUTPATIENT)
Dept: PRIMARY CARE CLINIC | Age: 52
End: 2022-12-13
Payer: COMMERCIAL

## 2022-12-13 ENCOUNTER — TELEPHONE (OUTPATIENT)
Dept: PRIMARY CARE CLINIC | Age: 52
End: 2022-12-13

## 2022-12-13 VITALS
HEIGHT: 67 IN | BODY MASS INDEX: 27.15 KG/M2 | HEART RATE: 62 BPM | OXYGEN SATURATION: 99 % | WEIGHT: 173 LBS | SYSTOLIC BLOOD PRESSURE: 115 MMHG | DIASTOLIC BLOOD PRESSURE: 80 MMHG

## 2022-12-13 DIAGNOSIS — M54.50 PAIN IN RIGHT LUMBAR REGION OF BACK: Primary | ICD-10-CM

## 2022-12-13 PROCEDURE — 99214 OFFICE O/P EST MOD 30 MIN: CPT | Performed by: NURSE PRACTITIONER

## 2022-12-13 PROCEDURE — 96372 THER/PROPH/DIAG INJ SC/IM: CPT | Performed by: NURSE PRACTITIONER

## 2022-12-13 RX ORDER — IBUPROFEN 800 MG/1
800 TABLET ORAL EVERY 8 HOURS PRN
Qty: 90 TABLET | Refills: 1 | Status: SHIPPED | OUTPATIENT
Start: 2022-12-13

## 2022-12-13 RX ORDER — CYCLOBENZAPRINE HCL 10 MG
10 TABLET ORAL 3 TIMES DAILY PRN
Qty: 21 TABLET | Refills: 0 | Status: SHIPPED | OUTPATIENT
Start: 2022-12-13 | End: 2022-12-23

## 2022-12-13 RX ORDER — KETOROLAC TROMETHAMINE 30 MG/ML
60 INJECTION, SOLUTION INTRAMUSCULAR; INTRAVENOUS ONCE
Status: COMPLETED | OUTPATIENT
Start: 2022-12-13 | End: 2022-12-13

## 2022-12-13 RX ORDER — METHYLPREDNISOLONE ACETATE 80 MG/ML
80 INJECTION, SUSPENSION INTRA-ARTICULAR; INTRALESIONAL; INTRAMUSCULAR; SOFT TISSUE ONCE
Status: COMPLETED | OUTPATIENT
Start: 2022-12-13 | End: 2022-12-13

## 2022-12-13 RX ADMIN — METHYLPREDNISOLONE ACETATE 80 MG: 80 INJECTION, SUSPENSION INTRA-ARTICULAR; INTRALESIONAL; INTRAMUSCULAR; SOFT TISSUE at 15:12

## 2022-12-13 RX ADMIN — KETOROLAC TROMETHAMINE 60 MG: 30 INJECTION, SOLUTION INTRAMUSCULAR; INTRAVENOUS at 15:04

## 2022-12-13 ASSESSMENT — ENCOUNTER SYMPTOMS
VOMITING: 0
DIARRHEA: 0
TROUBLE SWALLOWING: 0
WHEEZING: 0
COUGH: 0
BLOOD IN STOOL: 0
SORE THROAT: 0
ABDOMINAL PAIN: 0
BACK PAIN: 1
CONSTIPATION: 0
SINUS PRESSURE: 0
NAUSEA: 0
SHORTNESS OF BREATH: 0

## 2022-12-13 ASSESSMENT — PATIENT HEALTH QUESTIONNAIRE - PHQ9
SUM OF ALL RESPONSES TO PHQ9 QUESTIONS 1 & 2: 0
SUM OF ALL RESPONSES TO PHQ QUESTIONS 1-9: 0
SUM OF ALL RESPONSES TO PHQ QUESTIONS 1-9: 0
1. LITTLE INTEREST OR PLEASURE IN DOING THINGS: 0
2. FEELING DOWN, DEPRESSED OR HOPELESS: 0
SUM OF ALL RESPONSES TO PHQ QUESTIONS 1-9: 0
SUM OF ALL RESPONSES TO PHQ QUESTIONS 1-9: 0

## 2022-12-13 NOTE — TELEPHONE ENCOUNTER
Pt transferred to office via [facility] ([facility]). Pt states she was carrying a laundry basket yesterday that was very full, pt leaned over to put basket down & felt pain in her back. Pt scheduled with provider in the office to be seen for pain.

## 2022-12-13 NOTE — PROGRESS NOTES
[address] [place] [address] CARE  [facility] [address]  [address] [address]  Dept: [phone]  Dept Fax: [phone]    Santiago Chadwick is a 47 y.o. female who presentstoday for her medical conditions/complaints as noted below. Santiago Chadwick is c/o of  Chief Complaint   Patient presents with    Back Pain     Lower Lumbar back pain. Was doing laundry thinks she pulled something. Has tried hot and cold therapy. Couldn't put her socks and shoes on because she was in so much pain       HPI:     Here today for lower back that started yesterday  She states was doing laundry and lifted heavy basket, felt a pull  She had to lie flat the majority of the day due to the pain  Was able to sleep without issue  Denies any numbness or tingling  Has taken some tylenol, used heating pad, hot shower with minimal relief  The pain worsens with lifting her legs, the right is worse than left  Was unable to put on her own shoes, \" had to help this morning\"  She states has history of intermittent back pain  Has had some imaging but not of lumbar spine  Has never completed PT for her lower back but did complete some PT about a year and a half ago for tailbone pain  States recently lost weight and has found this to be very helpful in regard to less chronic back pain      Hemoglobin A1C (%)   Date Value   07/21/2022 5.0   07/15/2021 5.2             ( goal A1C is < 7)   No results found for: LABMICR  LDL Cholesterol (mg/dL)   Date Value   07/21/2022 139 (H)   07/15/2021 128   08/28/2020 135 (H)     LDL Calculated (mg/dL)   Date Value   05/23/2013 117       (goal LDL is <100)   AST (U/L)   Date Value   07/21/2022 29     ALT (U/L)   Date Value   07/21/2022 24     BUN (mg/dL)   Date Value   07/21/2022 13     BP Readings from Last 3 Encounters:   12/13/22 115/80   10/06/22 122/74   09/08/22 110/76          (skdl105/80)    History reviewed. No pertinent past medical history.    Past Surgical History:   Procedure Laterality Date    COLONOSCOPY  12/09/2021    COLONOSCOPY N/A 12/9/2021    COLORECTAL CANCER SCREENING, NOT HIGH RISK performed by Jessica Guan DO at . [address] [place] 50, TOTAL ABDOMINAL (CERVIX REMOVED)      left ovarier        Family History   Problem Relation Age of Onset    Brain Cancer Father           Social History     Tobacco Use    Smoking status: Never    Smokeless tobacco: Never   Substance Use Topics    Alcohol use: Yes     Alcohol/week: 0.0 standard drinks      Current Outpatient Medications   Medication Sig Dispense Refill    cyclobenzaprine (FLEXERIL) 10 MG tablet Take 1 tablet by mouth 3 times daily as needed for Muscle spasms 21 tablet 0    ibuprofen (ADVIL;MOTRIN) 800 MG tablet Take 1 tablet by mouth every 8 hours as needed for Pain 90 tablet 1    scopolamine (TRANSDERM-SCOP, 1.5 MG,) transdermal patch Place 1 patch onto the skin every 72 hours 10 patch 0    Acetaminophen (TYLENOL 8 HOUR PO) Take by mouth      Fexofenadine HCl (ALLEGRA ALLERGY PO) Take by mouth      vitamin C (ASCORBIC ACID) 500 MG tablet Take 500 mg by mouth daily      VITAMIN D PO Take by mouth      Doxylamine Succinate, Sleep, (UNISOM PO) Take by mouth      ibuprofen (ADVIL;MOTRIN) 200 MG tablet Take 200 mg by mouth every 6 hours as needed for Pain       No current facility-administered medications for this visit.      No Known Allergies    Health Maintenance   Topic Date Due    Shingles vaccine (1 of 2) Never done    Breast cancer screen  01/28/2023    Depression Screen  10/06/2023    Lipids  07/21/2027    DTaP/Tdap/Td vaccine (2 - Td or Tdap) 07/05/2028    Colorectal Cancer Screen  12/09/2031    Flu vaccine  Completed    COVID-19 Vaccine  Completed    Hepatitis C screen  Completed    HIV screen  Completed    Hepatitis A vaccine  Aged Out    Hib vaccine  Aged Out    Meningococcal (ACWY) vaccine  Aged Out    Pneumococcal 0-64 years Vaccine  Aged Out       Subjective:      Review of Systems Constitutional:  Negative for activity change, appetite change, chills, fatigue, fever and unexpected weight change. HENT:  Negative for congestion, ear pain, hearing loss, sinus pressure, sore throat and trouble swallowing. Eyes:  Negative for visual disturbance. Respiratory:  Negative for cough, shortness of breath and wheezing. Cardiovascular:  Negative for chest pain, palpitations and leg swelling. Gastrointestinal:  Negative for abdominal pain, blood in stool, constipation, diarrhea, nausea and vomiting. Endocrine: Negative for cold intolerance, heat intolerance, polydipsia, polyphagia and polyuria. Genitourinary:  Negative for difficulty urinating, frequency, hematuria and urgency. Musculoskeletal:  Positive for back pain. Negative for arthralgias and myalgias. Skin:  Negative for rash. Allergic/Immunologic: Negative for environmental allergies. Neurological:  Negative for dizziness, weakness, light-headedness and headaches. Psychiatric/Behavioral:  Negative for confusion. The patient is not nervous/anxious. Objective:     Physical Exam  Constitutional:       Appearance: Normal appearance. She is well-developed. HENT:      Head: Normocephalic. Eyes:      Conjunctiva/sclera: Conjunctivae normal.      Pupils: Pupils are equal, round, and reactive to light. Cardiovascular:      Rate and Rhythm: Normal rate and regular rhythm. Heart sounds: Normal heart sounds. No murmur heard. Pulmonary:      Effort: Pulmonary effort is normal.      Breath sounds: Normal breath sounds. No wheezing. Abdominal:      General: Bowel sounds are normal. There is no distension. Palpations: Abdomen is soft. Musculoskeletal:      Cervical back: Normal range of motion. Lumbar back: Tenderness present. Decreased range of motion.  Positive right straight leg raise test and positive left straight leg raise test.      Comments: Visibly uncomfortable with position changes   Skin: General: Skin is warm and dry. Neurological:      Mental Status: She is alert and oriented to person, place, and time. Psychiatric:         Behavior: Behavior normal.         Thought Content: Thought content normal.         Judgment: Judgment normal.     /80   Pulse 62   Ht 5' 6.5\" (1.689 m)   Wt 173 lb (78.5 kg)   SpO2 99%   BMI 27.50 kg/m²     Assessment:       Diagnosis Orders   1. Pain in right lumbar region of back  ketorolac (TORADOL) injection 60 mg    methylPREDNISolone acetate (DEPO-MEDROL) injection 80 mg    cyclobenzaprine (FLEXERIL) 10 MG tablet    ibuprofen (ADVIL;MOTRIN) 800 MG tablet                Plan:      Return if symptoms worsen or fail to improve. Back pain-Toradol and Depo-Medrol injections in office, start Motrin 2-3 times daily, Flexeril as needed, continue heating pad and rest.  Discussed consideration of PT and/or lumbar spine imaging if continues to have frequent recurrences       Orders Placed This Encounter   Medications    ketorolac (TORADOL) injection 60 mg    methylPREDNISolone acetate (DEPO-MEDROL) injection 80 mg    cyclobenzaprine (FLEXERIL) 10 MG tablet     Sig: Take 1 tablet by mouth 3 times daily as needed for Muscle spasms     Dispense:  21 tablet     Refill:  0    ibuprofen (ADVIL;MOTRIN) 800 MG tablet     Sig: Take 1 tablet by mouth every 8 hours as needed for Pain     Dispense:  90 tablet     Refill:  1       Patient given educational materials - see patient instructions. Discussed use, benefit, and side effects of prescribed medications. All patientquestions answered. Pt voiced understanding. Reviewed health maintenance. Instructedto continue current medications, diet and exercise. Patient agreed with treatmentplan. Follow up as directed.      Electronicallysigned by JOHN Miles CNP on 12/13/2022 at 3:37 PM

## 2022-12-13 NOTE — PROGRESS NOTES
After obtaining consent, and per orders of Jay Garcia, injection of Ketorolac 60 mg  and Methylprednisolone acetate 80 mg given in left dorsogluteal and right dorsogluteal by Luis Angel Kaufman. Patient instructed to remain in clinic for 20 minutes afterwards, and to report any adverse reaction to me immediately.

## 2023-04-15 ENCOUNTER — HOSPITAL ENCOUNTER (OUTPATIENT)
Age: 53
Discharge: HOME | End: 2023-04-17
Payer: COMMERCIAL

## 2023-04-15 ENCOUNTER — HOSPITAL ENCOUNTER (OUTPATIENT)
Dept: GENERAL RADIOLOGY | Age: 53
Discharge: HOME | End: 2023-04-17
Payer: COMMERCIAL

## 2023-04-15 DIAGNOSIS — M54.50 LUMBAR PAIN: ICD-10-CM

## 2023-04-15 PROCEDURE — 72110 X-RAY EXAM L-2 SPINE 4/>VWS: CPT

## 2023-04-27 ENCOUNTER — HOSPITAL ENCOUNTER (OUTPATIENT)
Dept: PHYSICAL THERAPY | Facility: CLINIC | Age: 53
Setting detail: THERAPIES SERIES
Discharge: HOME | End: 2023-04-27
Payer: COMMERCIAL

## 2023-04-27 PROCEDURE — 97161 PT EVAL LOW COMPLEX 20 MIN: CPT

## 2023-04-27 PROCEDURE — 97110 THERAPEUTIC EXERCISES: CPT

## 2023-04-27 NOTE — CONSULTS
[place] [address]  Phone: [phone]  Fax: [phone]        Physical Therapy Spine Evaluation    Date:  2023  Patient: Sharonda Monge  : 1970  MRN: [id number]  Physician: JOHN Shanks - CNP   Insurance: [employer] Pillai 150 ( Visits Approved, HARD MAX)  Medical Diagnosis: M54.50 (ICD-10-CM) - Lumbar pain   Rehab Codes: M54.5  Onset Date: 23  Next 's appt.: 23      Subjective:   CC/HPI: Pt reports to PT with low back pain. Per pt, she reports that she has been having low back pain for a few weeks now. Pt states that she would have issues with bending and lifting at home and at work, but does not recall any specific injury or incident. Pt states that she had an injection and some muscle relaxers following increased pain, and she reports that this helped, however she still has symptoms present. Pt states that she is able to do all activities that she needs to do, but can have increased pain with completion. Pt denies any numbness/tingling in her legs or feet. Notes that her pain is worse in the morning. Per pt, she reports that she has been told that she has something going on with her L4-L5 region, but does not recall what she was told was wrong. PMHx:   [] Unremarkable               [x] Refer to full medical chart  In EPIC     Tests: [x] X-Ray: FINDINGS:  Vertebral body heights and alignment are maintained. Pedicles are intact. There is no convincing evidence of acute fracture. SI joints appear  symmetric and slightly narrowed bilaterally. Multilevel osteophytes with  slight loss of disc space height L2-L3. Very mild facet arthropathy lower  lumbar spine. Somewhat limited motion without dynamic instability on  flexion/extension views. IMPRESSION:  No acute osseous abnormality. Mild lumbar spondylotic changes.    [] MRI:   [] Other:    Comorbidities:   [] Obesity [] Dialysis  [x] N/A   []

## 2023-05-01 ENCOUNTER — HOSPITAL ENCOUNTER (OUTPATIENT)
Dept: PHYSICAL THERAPY | Facility: CLINIC | Age: 53
Setting detail: THERAPIES SERIES
Discharge: HOME | End: 2023-05-01
Payer: COMMERCIAL

## 2023-05-01 PROCEDURE — 97110 THERAPEUTIC EXERCISES: CPT

## 2023-05-01 NOTE — FLOWSHEET NOTE
[] [address]  [address]  [phone]  F: [phone] [] [address]  [facility]   [address]  P: [phone]  F: [phone] [x] [address]  [address]  P: [phone]  F: [phone] [] [address]  P: [phone]  F: [phone] [] [address]  [facility]   [address]   [place]: [phone]  F: [phone]      Physical Therapy Daily Treatment Note    Date:  2023  Patient Name:  Kathy Goetz    :  1970  MRN: [id number]  Physician: JOHN Ibanez - CNP                                 Insurance: [facility] [employer] Pillai 150 ( Visits Approved, HARD MAX)  Medical Diagnosis: M54.50 (ICD-10-CM) - Lumbar pain      Rehab Codes: M54.5  Onset Date: 23                 Next 's appt.: 23  Visit# / total visits:     Cancels/No Shows: 0/0    Subjective: Pt stated that she's having intermittent pain. Pt denies soreness after last tx. Pain:  [] Yes  [x] No Location:  N/A Pain Rating: (0-10 scale) 0/10  Pain altered Tx:  [x] No  [] Yes  Action:  Comments:    Objective:     Today's Treatment:  Modalities:   Precautions:  Exercise Reps/ Time Weight/ Level Comments   NuStep  8' L3 x         Seated HS S 3x30\"  x   SB S  3x30\"  x   SKTC  3x30\"   x   Piriformis S  3x30\"   x   LTR 10x10\"  x   Quad S 3x30\"   x         Bridges  2x10  x   SLR  2x10  x   SL clamshells       SL hip abduction             Opposite arm/leg       TA sets       Palloff Press                                                       Other:      Treatment Charges: Mins Units   []  Modalities     [x]  Ther Exercise 39 3   []  Manual Therapy     []  Ther Activities     []  Aquatics     []  Vasocompression     []  Other

## 2023-05-04 ENCOUNTER — HOSPITAL ENCOUNTER (OUTPATIENT)
Dept: PAIN MANAGEMENT | Age: 53
Discharge: HOME | End: 2023-05-04
Payer: COMMERCIAL

## 2023-05-04 ENCOUNTER — HOSPITAL ENCOUNTER (OUTPATIENT)
Dept: PHYSICAL THERAPY | Facility: CLINIC | Age: 53
Setting detail: THERAPIES SERIES
Discharge: HOME | End: 2023-05-04
Payer: COMMERCIAL

## 2023-05-04 VITALS
RESPIRATION RATE: 20 BRPM | HEART RATE: 61 BPM | HEIGHT: 67 IN | OXYGEN SATURATION: 96 % | TEMPERATURE: 97.3 F | SYSTOLIC BLOOD PRESSURE: 127 MMHG | DIASTOLIC BLOOD PRESSURE: 72 MMHG | BODY MASS INDEX: 27.15 KG/M2 | WEIGHT: 173 LBS

## 2023-05-04 DIAGNOSIS — M54.50 CHRONIC BILATERAL LOW BACK PAIN WITHOUT SCIATICA: ICD-10-CM

## 2023-05-04 DIAGNOSIS — M47.817 LUMBOSACRAL SPONDYLOSIS WITHOUT MYELOPATHY: Primary | ICD-10-CM

## 2023-05-04 DIAGNOSIS — G89.29 CHRONIC BILATERAL LOW BACK PAIN WITHOUT SCIATICA: ICD-10-CM

## 2023-05-04 PROCEDURE — 97110 THERAPEUTIC EXERCISES: CPT

## 2023-05-04 PROCEDURE — 99204 OFFICE O/P NEW MOD 45 MIN: CPT | Performed by: ANESTHESIOLOGY

## 2023-05-04 PROCEDURE — 99203 OFFICE O/P NEW LOW 30 MIN: CPT

## 2023-05-04 RX ORDER — TIZANIDINE 4 MG/1
4 TABLET ORAL NIGHTLY PRN
Qty: 30 TABLET | Refills: 0 | Status: SHIPPED | OUTPATIENT
Start: 2023-05-04 | End: 2023-06-03

## 2023-05-04 ASSESSMENT — ENCOUNTER SYMPTOMS
BACK PAIN: 1
NAUSEA: 0
CONSTIPATION: 0
VOMITING: 0
SHORTNESS OF BREATH: 0
DIARRHEA: 0

## 2023-05-04 NOTE — PROGRESS NOTES
The patient is a 47 y. o. Non- / non  female. Chief Complaint   Patient presents with    New Patient    Back Pain        Back Pain  Pertinent negatives include no chest pain, fever, headaches, numbness or weakness. Requesting physician for the evaluation of [facility] 1970:     Pain History  -Back pain  Located in the lower lumbar area  Going on for several years progressively worsening describes it as aching stabbing throbbing sensation  No dermatomal radiation in legs  No associated dermatomal numbness or paresthesia  No changes in bladder or bowel control  No history of fever chills or weight loss  Did physical therapy in 2021  Tried chiropractic treatment  Have restarted therapy  Had recent x-ray lumbar spine  No previous MRI lumbar spine  No previous lumbar spine injection or surgical history    Pain is progressively worsening affecting quality of life and activity level  Have tried NSAIDs  Pain score today  2  1. Location:Lower back   2. Radiation:no  3. Character:aching pulling shooting  5. Duration:years  6. Onset: years  7. Did an injury cause pain: no  8. Aggravating factors:lifting and bending  9. Alleviating factors:IBU  10. Associated symptoms (numbness / tingling / weakness):  no  -Where at:na  -Down into finger tips or toes (specify which finger or toes):na  -constant or intermitting: intermitting  11. Red Flags: (weight loss / chills / loss of bladder or bowel control):no    Previous management history  1. Previous diagnostic workup: (Imaging/EMG)   CT, MRI, or Xray: XR  What part of the body:Lumbar spine  [facility]  What year or specific date: 2023  EMG:  no    2. Previous non interventional treatments tried:  chiropractor or physical therapy: PT  What part of the body:Back  What facility was it done at: [facility]  How long ago was it last tried: Current  Did it work: Yes  Did they complete it:current    3.  Previous Medications

## 2023-05-04 NOTE — FLOWSHEET NOTE
[] [address]  [address]  [phone]  F: [phone] [] [address]  [facility]   [address]  P: [phone]  F: [phone] [x] [place] [facility]  Therapy  [address]  P: [phone]  F: [phone] [] [address]  P: [phone]  F: [phone] [] [address]  [facility]   [address]   [place]: [phone]  F: [phone]      Physical Therapy Daily Treatment Note    Date:  2023  Patient Name:  Randy Suanders    :  1970  MRN: [id number]  Physician: Electa Brittle, APRN - SHERIF                                 Insurance: [employer] ( Visits Approved, HARD MAX)  Medical Diagnosis: M54.50 (ICD-10-CM) - Lumbar pain      Rehab Codes: M54.5  Onset Date: 23                 Next 's appt.: 23  Visit# / total visits: 3/16    Cancels/No Shows: 0/0    Subjective: Pt stated that she's still having intermittent pain and has \"very little\" mm soreness on arrival. She reported went to pain manageemnt this morning and maybe getting an MRI but no date set as of yet. Pain:  [] Yes  [x] No Location:  N/A Pain Rating: (0-10 scale) 1/10  Pain altered Tx:  [x] No  [] Yes  Action:  Comments:    Objective:     Today's Treatment:  Modalities:   Precautions:  Exercise Reps/ Time Weight/ Level Comments   NuStep  8' L3 x         Seated HS S 3x30\"  x   SB S  3x30\"  x   SKTC  3x30\"   x   Piriformis S  3x30\"   x   LTR 10x10\"  x   Quad S 3x30\"   x         Bridges  2x10  x   SLR  1x10  X 10 reps 5/4    SL clamshells       SL hip abduction             Opposite arm/leg       TA sets  10x10\"  Added 5/   Palloff Press       TA+ marches  20x  Added 5/4

## 2023-05-08 ENCOUNTER — HOSPITAL ENCOUNTER (OUTPATIENT)
Dept: PHYSICAL THERAPY | Facility: CLINIC | Age: 53
Setting detail: THERAPIES SERIES
Discharge: HOME | End: 2023-05-08
Payer: COMMERCIAL

## 2023-05-08 PROCEDURE — 97110 THERAPEUTIC EXERCISES: CPT

## 2023-05-08 NOTE — FLOWSHEET NOTE
Observation Goals:    -diagnostic tests and consults completed and resulted - not met  -vital signs normal or at patient baseline - met  -tolerating oral intake to maintain hydration - not met   [] [address]  [address]  [phone]  F: [phone] [] [address]  [facility] 36   [address]  P: [phone]  F: [phone] [x] [place] [facility]  Therapy  [address]  P: [phone]  F: [phone] [] [address]  P: [phone]  F: [phone] [] [address]  [facility]   [address]   [place]: [phone]  F: [phone]      Physical Therapy Daily Treatment Note    Date:  2023  Patient Name:  Dominic German    :  1970  MRN: [id number]  Physician: JOHN Velasquez - CNP                                 Insurance: Dieudonne Russell ( Visits Approved, HARD MAX)  Medical Diagnosis: M54.50 (ICD-10-CM) - Lumbar pain      Rehab Codes: M54.5  Onset Date: 23                 Next 's appt.: 23  Visit# / total visits:     Cancels/No Shows: 0/0    Subjective: Pt denied muscle soreness after last tx. Pt described pain as achy. Pain:  [x] Yes  [] No Location:  N/A Pain Rating: (0-10 scale) 2/10  Pain altered Tx:  [x] No  [] Yes  Action:  Comments:    Objective:     Today's Treatment:  Modalities:   Precautions:  Exercise Reps/ Time Weight/ Level Comments   NuStep  8' L3 x         Seated HS S 3x30\"  x   SB S  3x30\"  x   SKTC  3x30\"   x   Piriformis S  3x30\"   x   LTR 10x10\"  x   Quad S 3x30\"   x         Bridges  2x10 3'  x   SLR  1x10  x   SL clamshells  2x10   x   SL hip abduction  2x10   x         Modified Deadbug 2x10  x   TA sets  10x10\"     Palloff Press       TA+ marches  20x                                               Other:      Treatment Charges: Mins Units   []  Modalities     [x]  Ther Exercise 50 3   []  Manual Therapy     []  Ther Activities     []  Aquatics     []

## 2023-05-11 ENCOUNTER — HOSPITAL ENCOUNTER (OUTPATIENT)
Dept: PHYSICAL THERAPY | Facility: CLINIC | Age: 53
Setting detail: THERAPIES SERIES
Discharge: HOME | End: 2023-05-11
Payer: COMMERCIAL

## 2023-05-11 PROCEDURE — 97110 THERAPEUTIC EXERCISES: CPT

## 2023-05-11 NOTE — FLOWSHEET NOTE
demonstrating improved tolerance to activity to help improve ability to bend and lift at work. []  []  []      2. Reduce pain levels to 0/10 with all bending to help reduce risk for compensations with lifting and bending at work. []  []  []      3. ? Strength: Increase jaclyn LE/core strength to 4+/5 grossly to help improve lumbar stability with all lifting and bending while at work. []  []  []                        Patient goals: \"To learn how to exercise for my lower back issues. \"      Pt. Education:  [x] Yes  [] No  [] Reviewed Prior HEP/Ed  Method of Education: [x] Verbal  [x] Demo  [] Written  Comprehension of Education:  [x] Verbalizes understanding. [x] Demonstrates understanding. [] Needs review. [] Demonstrates/verbalizes HEP/Ed previously given. Access Code: [email or web address]  URL: [email or web address]  Date: 04/27/2023  Prepared by: Darius Dixon     Exercises  - Supine Lower Trunk Rotation  - 3 x daily - 7 x weekly - 10 reps - 10 seconds hold  - Supine Transversus Abdominis Bracing - Hands on Stomach  - 2-3 x daily - 7 x weekly - 2 sets - 10 reps - 5 seconds hold     Plan: [x] Continue current frequency toward long and short term goals.     [x] Specific Instructions for subsequent treatments: Update HEP         Time In: 3:00 pm          Time Out:  3:50 pm    Electronically signed by:  Divya Bobo, The documentation above was reviewed and accepted by supervising Clinical Instructor Salazar Monroy PTA

## 2023-05-15 ENCOUNTER — HOSPITAL ENCOUNTER (OUTPATIENT)
Dept: PHYSICAL THERAPY | Facility: CLINIC | Age: 53
Setting detail: THERAPIES SERIES
Discharge: HOME | End: 2023-05-15
Payer: COMMERCIAL

## 2023-05-15 PROCEDURE — 97110 THERAPEUTIC EXERCISES: CPT

## 2023-05-15 PROCEDURE — 97140 MANUAL THERAPY 1/> REGIONS: CPT

## 2023-05-15 NOTE — FLOWSHEET NOTE
Mins Units   []  Modalities     [x]  Ther Exercise 28 2   [x]  Manual Therapy 15 1   []  Ther Activities     []  Aquatics     []  Vasocompression     []  Other     Total Treatment time 43 3       Assessment: [x] Progressing toward goals. Continued with warmp and stretches. Utilized hypervolt this date to address posterior chain tension. Most tender into piriformis and glute med bilaterally. Attempted strengthening ex but pt experienced imtermittent HS cramping. Mat stretches completed. Will monitor response to manual interventions and progress pt as shawn. [] No change. [] Other:  [x] Patient would continue to benefit from skilled physical therapy services in order to: Improve jaclyn LE/low back flexibility, improve jaclyn LE/core strength, improve lumbar ROM, improve tolerance to all bending, demonstrate proper lifting mechanics, and reduce pain to help ease work activities. STG/LTG    Goals  MET NOT MET ON-  GOING  Details   Date Addressed: NA           STG: To be met in 8 treatments            1. ? Pain: Decrease pain levels to 2/10 with all bending to help reduce risk for compensations with lifting and bending at work. []  []  []      2. ? ROM: Increase flexibility in jaclyn LEs and low back to help improve lumbar ROM to WNL with no pain, reducing risk for compensations with bending at work. []  []  []      3. Improve score on assessment tool Modified Oswestry from 42% impairment to less than 22% impairment, demonstrating improved tolerance to activity to help improve ability to bend and lift at work. 4. Pt will demonstrate proper lifting mechanics to help reduce risk for further low back injury while at work.  []  []  []      5. Independent with Home Exercise Programs []  []  []        []  []  []        []  []  []      Date Addressed: NA           LTG: To be met in 16 treatments           1.  Improve score on assessment tool Modified Oswestry from 42% impairment to less than 22% impairment,

## 2023-05-18 ENCOUNTER — HOSPITAL ENCOUNTER (OUTPATIENT)
Dept: PHYSICAL THERAPY | Facility: CLINIC | Age: 53
Setting detail: THERAPIES SERIES
Discharge: HOME | End: 2023-05-18
Payer: COMMERCIAL

## 2023-05-18 PROCEDURE — 97140 MANUAL THERAPY 1/> REGIONS: CPT

## 2023-05-18 PROCEDURE — 97110 THERAPEUTIC EXERCISES: CPT

## 2023-05-18 NOTE — FLOWSHEET NOTE
[] [facility]  [address]  [phone]  F: [phone] [] [address]  [facility] 36   [address]  P: [phone]  F: [phone] [x] [address]  [address]  P: [phone]  F: [phone] [] [address]  P: [phone]  F: [phone] [] [address]  [facility]   [address]   [place]: [phone]  F: [phone]      Physical Therapy Daily Treatment Note    Date:  2023  Patient Name:  Antionette Ellis    :  1970  MRN: [id number]  Physician: JOHN Leos - CNP                                 Insurance: [employer] ( Visits Approved, HARD MAX)  Medical Diagnosis: M54.50 (ICD-10-CM) - Lumbar pain      Rehab Codes: M54.5  Onset Date: 23                 Next 's appt.: 23 MRI                                                    Pain doctor: 23  Visit# / total visits:     Cancels/No Shows: 0/0    Subjective: Pt reports no pain in LB on arrival, just HS soreness from manual and cramping last tx. Pain:  [] Yes  [x] No Location:  N/A Pain Rating: (0-10 scale) 0/10  Pain altered Tx:  [x] No  [] Yes  Action:  Comments:    Objective:     Today's Treatment:  Modalities:   Precautions:  Exercise Reps/ Time Weight/ Level Comments   NuStep  8' L3 x         Standing  HS S 3x30\"  x   SB S  3x30\"  x   SKTC  3x30\"   x   Piriformis S  3x30\"   x   LTR 10x10\"  x   Quad S 3x30\"            Bridges  2x10 3'  x   SLR  2x10     SL clamshells  2x10  orange x   SL hip abduction  2x10  orange x         Modified Deadbug 2x10  x   TA sets  10x10\"     Palloff Press  2x10  Red cord    TA+ marches  20x  x   Shot gun    x                                       Other: Manual: prone, pillow

## 2023-05-22 ENCOUNTER — HOSPITAL ENCOUNTER (OUTPATIENT)
Dept: PHYSICAL THERAPY | Facility: CLINIC | Age: 53
Setting detail: THERAPIES SERIES
Discharge: HOME | End: 2023-05-22
Payer: COMMERCIAL

## 2023-05-22 PROCEDURE — 97110 THERAPEUTIC EXERCISES: CPT

## 2023-05-22 PROCEDURE — 97140 MANUAL THERAPY 1/> REGIONS: CPT

## 2023-05-22 NOTE — FLOWSHEET NOTE
[] [facility]  [address]  [phone]  F: [phone] [] [address]  [place] [address]   [address]  P: [phone]  F: [phone] [x] [address]  [address]  P: [phone]  F: [phone] [] [address]  P: [phone]  F: [phone] [] [address]  [facility]   [address]   [place]: [phone]  F: [phone]      Physical Therapy Daily Treatment Note    Date:  2023  Patient Name:  Jens Brar    :  1970  MRN: [id number]  Physician: JOHN Franklin - SHERIF                                 Insurance: [employer] ( Visits Approved, HARD MAX)  Medical Diagnosis: M54.50 (ICD-10-CM) - Lumbar pain      Rehab Codes: M54.5  Onset Date: 23                 Next 's appt.: 23 MRI                                                    Pain doctor: 23  Visit# / total visits:     Cancels/No Shows: 0/0    Subjective: pain remains present, LB to upper glut region. A little relief following her previous visit. Does have an MRI scheduled for this Thursday. Pain:  [x] Yes  [] No Location:  N/A Pain Rating: (0-10 scale) 2-3/10  Pain altered Tx:  [x] No  [] Yes  Action:  Comments:    Objective:     Today's Treatment:  Modalities:   Precautions:  Exercise Reps/ Time Weight/ Level Comments   NuStep  8' L3 x         Standing  HS S 3x30\"  x   SB S  3x30\"  x   SKTC  3x30\"   x   Piriformis S  3x30\"   x   LTR 10x10\"  x   Quad S 3x30\"            Bridges  2x10 3'  x   SLR  2x10     SL clamshells  2x10  orange X, no band   SL hip abduction  2x10  orange X, no band         Modified Deadbug 2x10  x   TA sets  10x10\"  x   Palloff Press  2x10  Red cord    TA+ marches  20x  x   Shot gun

## 2023-05-25 ENCOUNTER — OFFICE VISIT (OUTPATIENT)
Dept: PRIMARY CARE CLINIC | Age: 53
End: 2023-05-25
Payer: COMMERCIAL

## 2023-05-25 ENCOUNTER — HOSPITAL ENCOUNTER (OUTPATIENT)
Dept: PHYSICAL THERAPY | Facility: CLINIC | Age: 53
Setting detail: THERAPIES SERIES
Discharge: HOME | End: 2023-05-25
Payer: COMMERCIAL

## 2023-05-25 ENCOUNTER — HOSPITAL ENCOUNTER (OUTPATIENT)
Dept: MRI IMAGING | Facility: CLINIC | Age: 53
Discharge: HOME | End: 2023-05-27
Payer: COMMERCIAL

## 2023-05-25 VITALS
BODY MASS INDEX: 26.24 KG/M2 | DIASTOLIC BLOOD PRESSURE: 74 MMHG | HEART RATE: 89 BPM | SYSTOLIC BLOOD PRESSURE: 122 MMHG | HEIGHT: 67 IN | RESPIRATION RATE: 12 BRPM | OXYGEN SATURATION: 99 % | WEIGHT: 167.2 LBS

## 2023-05-25 DIAGNOSIS — M54.50 LUMBAR PAIN: Primary | ICD-10-CM

## 2023-05-25 DIAGNOSIS — Z13.220 SCREENING FOR LIPID DISORDERS: ICD-10-CM

## 2023-05-25 DIAGNOSIS — Z13.1 SCREENING FOR DIABETES MELLITUS: ICD-10-CM

## 2023-05-25 DIAGNOSIS — Z13.0 SCREENING FOR DEFICIENCY ANEMIA: ICD-10-CM

## 2023-05-25 DIAGNOSIS — G89.29 CHRONIC BILATERAL LOW BACK PAIN WITHOUT SCIATICA: ICD-10-CM

## 2023-05-25 DIAGNOSIS — M47.817 LUMBOSACRAL SPONDYLOSIS WITHOUT MYELOPATHY: ICD-10-CM

## 2023-05-25 DIAGNOSIS — M54.50 CHRONIC BILATERAL LOW BACK PAIN WITHOUT SCIATICA: ICD-10-CM

## 2023-05-25 DIAGNOSIS — Z13.29 SCREENING FOR THYROID DISORDER: ICD-10-CM

## 2023-05-25 PROCEDURE — 99213 OFFICE O/P EST LOW 20 MIN: CPT | Performed by: NURSE PRACTITIONER

## 2023-05-25 PROCEDURE — 97140 MANUAL THERAPY 1/> REGIONS: CPT

## 2023-05-25 PROCEDURE — 97110 THERAPEUTIC EXERCISES: CPT

## 2023-05-25 PROCEDURE — 72148 MRI LUMBAR SPINE W/O DYE: CPT

## 2023-05-25 ASSESSMENT — ENCOUNTER SYMPTOMS
COUGH: 0
BACK PAIN: 1
ABDOMINAL PAIN: 0
SHORTNESS OF BREATH: 0

## 2023-05-25 ASSESSMENT — PATIENT HEALTH QUESTIONNAIRE - PHQ9
2. FEELING DOWN, DEPRESSED OR HOPELESS: 0
SUM OF ALL RESPONSES TO PHQ QUESTIONS 1-9: 0
1. LITTLE INTEREST OR PLEASURE IN DOING THINGS: 0
SUM OF ALL RESPONSES TO PHQ QUESTIONS 1-9: 0
SUM OF ALL RESPONSES TO PHQ QUESTIONS 1-9: 0
SUM OF ALL RESPONSES TO PHQ9 QUESTIONS 1 & 2: 0
SUM OF ALL RESPONSES TO PHQ QUESTIONS 1-9: 0

## 2023-05-25 NOTE — FLOWSHEET NOTE
activity to help improve ability to bend and lift at work. 4. Pt will demonstrate proper lifting mechanics to help reduce risk for further low back injury while at work.  []  []  []      5. Independent with Home Exercise Programs []  []  []        []  []  []        []  []  []      Date Addressed: NA           LTG: To be met in 16 treatments           1. Improve score on assessment tool Modified Oswestry from 42% impairment to less than 22% impairment, demonstrating improved tolerance to activity to help improve ability to bend and lift at work. []  []  []      2. Reduce pain levels to 0/10 with all bending to help reduce risk for compensations with lifting and bending at work. []  []  []      3. ? Strength: Increase jaclyn LE/core strength to 4+/5 grossly to help improve lumbar stability with all lifting and bending while at work. []  []  []                        Patient goals: \"To learn how to exercise for my lower back issues. \"      Pt. Education:  [x] Yes  [] No  [] Reviewed Prior HEP/Ed  Method of Education: [x] Verbal  [x] Demo  [] Written  Comprehension of Education:  [x] Verbalizes understanding. [x] Demonstrates understanding. [] Needs review. [] Demonstrates/verbalizes HEP/Ed previously given. Access Code: [email or web address]  URL: [email or web address]  Date: 04/27/2023  Prepared by: Micaela Cevallos     Exercises  - Supine Lower Trunk Rotation  - 3 x daily - 7 x weekly - 10 reps - 10 seconds hold  - Supine Transversus Abdominis Bracing - Hands on Stomach  - 2-3 x daily - 7 x weekly - 2 sets - 10 reps - 5 seconds hold     Plan: [x] Continue current frequency toward long and short term goals.     [x] Specific Instructions for subsequent treatments: Update HEP         Time In: 9:00 am          Time Out:  9:52 am    Electronically signed by:  Agatha Alegria PTA

## 2023-05-25 NOTE — PROGRESS NOTES
[address] PRIMARY CARE  [address] [facility]  [address] [address]  Dept: [phone]  Dept Fax: [phone]    Ruba Lam is a 47 y.o. female who presentstoday for her medical conditions/complaints as noted below. Ruba Lam is c/o of  Chief Complaint   Patient presents with    Back Pain     6 week follow up /PT is helping            HPI:     Presents for recheck on back pain  BP well controlled  Has lost 6lb since LOV in December  Working on diet/exercise    Lower back pain, different than in December  Encouraged to start PT  Has done  sessions, pain is improving  Est with Dr. Bradley Patten  Using muscle relaxer nightly  Has MRI scheduled for today  Then will re-evaluate plan    Willing to update annual labs  Mammogram up to date    Denies any other problems/concerns        Hemoglobin A1C (%)   Date Value   2022 5.0   07/15/2021 5.2             ( goal A1C is < 7)   No results found for: LABMICR  LDL Cholesterol (mg/dL)   Date Value   2022 139 (H)   07/15/2021 128   2020 135 (H)     LDL Calculated (mg/dL)   Date Value   2013 117       (goal LDL is <100)   AST (U/L)   Date Value   2022 29     ALT (U/L)   Date Value   2022 24     BUN (mg/dL)   Date Value   2022 13     BP Readings from Last 3 Encounters:   23 122/74   23 127/72   23 116/70          ([id number])    History reviewed. No pertinent past medical history.    Past Surgical History:   Procedure Laterality Date    COLONOSCOPY  2021    COLONOSCOPY N/A 2021    COLORECTAL CANCER SCREENING, NOT HIGH RISK performed by José Miguel Cazares DO at [facility], TOTAL ABDOMINAL (CERVIX REMOVED)      left ovarier        Family History   Problem Relation Age of Onset    Anemia Mother     Asthma Mother     Cancer Mother         Multiple Myeloma    Brain Cancer Father     Cancer Father         Brain    Diabetes Maternal Grandfather

## 2023-05-31 ENCOUNTER — HOSPITAL ENCOUNTER (OUTPATIENT)
Dept: PHYSICAL THERAPY | Facility: CLINIC | Age: 53
Setting detail: THERAPIES SERIES
Discharge: HOME | End: 2023-05-31
Payer: COMMERCIAL

## 2023-05-31 PROCEDURE — 97110 THERAPEUTIC EXERCISES: CPT

## 2023-05-31 PROCEDURE — 97140 MANUAL THERAPY 1/> REGIONS: CPT

## 2023-05-31 NOTE — FLOWSHEET NOTE
[] [facility]  [address]  [phone]  F: [phone] [] [address]  [facility]   [address]  P: [phone]  F: [phone] [x] [address]  [address]  P: [phone]  F: [phone] [] [address]  P: [phone]  F: [phone] [] [address]  [facility]   [address]   [place]: [phone]  F: [phone]      Physical Therapy Daily Treatment Note    Date:  2023  Patient Name:  Brittny Ibrahim    :  1970  MRN: [id number]  Physician: JOHN Little CNP                                 Insurance: Ca[employer] ( Visits Approved, HARD MAX)  Medical Diagnosis: M54.50 (ICD-10-CM) - Lumbar pain      Rehab Codes: M54.5  Onset Date: 23                 Next 's appt. : 23 MRI                                                    Pain doctor: 23  Visit# / total visits: 10/16    Cancels/No Shows: 0/0    Subjective: Pt reported no change in pain since last tx and also reported her MRI results came back. She has a FU with the Doctor tomorrow to discuss the finding and possible options. Copy of MRI results are documented below. Pain:  [x] Yes  [] No Location:  N/A Pain Rating: (0-10 scale) 2-3/10  Pain altered Tx:  [x] No  [] Yes  Action:  Comments:    Objective:    MRI: 23  FINDINGS:  BONES/ALIGNMENT: There is normal alignment of the spine. The vertebral body  heights are maintained. The bone marrow signal appears unremarkable. SPINAL CORD: The conus terminates normally. SOFT TISSUES: No paraspinal mass identified. L1-L2: There is no significant disc herniation, spinal canal stenosis or  neural foraminal narrowing.      L2-L3: There is no

## 2023-06-01 ENCOUNTER — HOSPITAL ENCOUNTER (OUTPATIENT)
Dept: PHYSICAL THERAPY | Facility: CLINIC | Age: 53
Setting detail: THERAPIES SERIES
Discharge: HOME | End: 2023-06-01
Payer: COMMERCIAL

## 2023-06-01 ENCOUNTER — HOSPITAL ENCOUNTER (OUTPATIENT)
Dept: PAIN MANAGEMENT | Age: 53
Discharge: HOME | End: 2023-06-01
Payer: COMMERCIAL

## 2023-06-01 VITALS
OXYGEN SATURATION: 95 % | DIASTOLIC BLOOD PRESSURE: 75 MMHG | WEIGHT: 167.2 LBS | BODY MASS INDEX: 26.24 KG/M2 | SYSTOLIC BLOOD PRESSURE: 132 MMHG | HEART RATE: 54 BPM | HEIGHT: 67 IN

## 2023-06-01 DIAGNOSIS — M54.51 VERTEBROGENIC LOW BACK PAIN: ICD-10-CM

## 2023-06-01 DIAGNOSIS — M47.817 SPONDYLOSIS OF LUMBOSACRAL SPINE WITHOUT MYELOPATHY: Primary | ICD-10-CM

## 2023-06-01 PROCEDURE — 99214 OFFICE O/P EST MOD 30 MIN: CPT | Performed by: ANESTHESIOLOGY

## 2023-06-01 PROCEDURE — 99213 OFFICE O/P EST LOW 20 MIN: CPT

## 2023-06-01 PROCEDURE — 97140 MANUAL THERAPY 1/> REGIONS: CPT

## 2023-06-01 PROCEDURE — 97110 THERAPEUTIC EXERCISES: CPT

## 2023-06-01 RX ORDER — CYCLOBENZAPRINE HCL 10 MG
10 TABLET ORAL NIGHTLY PRN
Qty: 30 TABLET | Refills: 1 | Status: SHIPPED | OUTPATIENT
Start: 2023-06-01 | End: 2023-06-11

## 2023-06-01 ASSESSMENT — ENCOUNTER SYMPTOMS
CONSTIPATION: 0
CHEST TIGHTNESS: 0
BACK PAIN: 1
DIARRHEA: 0
VOMITING: 0
SHORTNESS OF BREATH: 0
NAUSEA: 0
WHEEZING: 0

## 2023-06-01 ASSESSMENT — PAIN SCALES - GENERAL: PAINLEVEL_OUTOF10: 0

## 2023-06-01 ASSESSMENT — PAIN DESCRIPTION - FREQUENCY: FREQUENCY: INTERMITTENT

## 2023-06-01 ASSESSMENT — PAIN DESCRIPTION - PAIN TYPE: TYPE: CHRONIC PAIN

## 2023-06-01 ASSESSMENT — PAIN DESCRIPTION - LOCATION: LOCATION: BACK

## 2023-06-01 NOTE — FLOWSHEET NOTE
[] [facility] [facility]  [address]  [phone]  F: [phone] [] [address]  [facility] 36   [address]  P: [phone]  F: [phone] [x] [place] [facility]  [address] [facility]  P: [phone]  F: [phone] [] [address]  P: [phone]  F: [phone] [] [address]  [facility]   [address]   [place]: [phone]  F: [phone]      Physical Therapy Daily Treatment Note    Date:  2023  Patient Name:  Lesli Monday    :  1970  MRN: [id number]  Physician: JOHN Dumont - CNP                                 Insurance: [employer] ( Visits Approved, HARD MAX)  Medical Diagnosis: M54.50 (ICD-10-CM) - Lumbar pain      Rehab Codes: M54.5  Onset Date: 23                 Next 's appt.: 23, PCP and pain doctor                                                    Pain doctor: 23  Visit# / total visits:     Cancels/No Shows: 0/0 her pain management     Subjective: Pt reported no change in pain since last tx. Pt stated seen her pain doctor to discuss MRI results. She stated he did not mention any contraindications for PT or ADL's. He stated wanted her to hold all pain meds to assess her real pain level and he will check with her in 2 months. Pain:  [x] Yes  [] No Location:  N/A Pain Rating: (0-10 scale) 2-3/10  Pain altered Tx:  [x] No  [] Yes  Action:  Comments:    Objective:    MRI: 23  FINDINGS:  BONES/ALIGNMENT: There is normal alignment of the spine. The vertebral body  heights are maintained. The bone marrow signal appears unremarkable. SPINAL CORD: The conus terminates normally. SOFT TISSUES: No paraspinal mass identified.      L1-L2: There is no significant

## 2023-06-06 ENCOUNTER — HOSPITAL ENCOUNTER (OUTPATIENT)
Dept: PHYSICAL THERAPY | Facility: CLINIC | Age: 53
Setting detail: THERAPIES SERIES
Discharge: HOME | End: 2023-06-06
Payer: COMMERCIAL

## 2023-06-06 PROCEDURE — 97110 THERAPEUTIC EXERCISES: CPT

## 2023-06-06 NOTE — FLOWSHEET NOTE
[] [facility]  [address]  [phone]  F: [phone] [] [address]  [facility] 36   [address]  P: [phone]  F: [phone] [x] [place] [facility]  [address]  P: [phone]  F: [phone] [] [address]  P: [phone]  F: [phone] [] [address]  [facility]   [address]   [place]: [phone]  F: [phone]      Physical Therapy Daily Treatment Note    Date:  2023  Patient Name:  José Miguel Bagley    :  1970  MRN: [id number]  Physician: JOHN Paz CNP                                 Insurance: [employer] ( Visits Approved, HARD MAX)  Medical Diagnosis: M54.50 (ICD-10-CM) - Lumbar pain      Rehab Codes: M54.5  Onset Date: 23                 Next 's appt.: 23, PCP and pain doctor                                                    Pain doctor: 23  Visit# / total visits:     Cancels/No Shows: 0/0 her pain management     Subjective: Pt mentioned that she had some testing to do after work today and sitting for over an hour she got a really bad cramp in the back of her legs. Pain:  [x] Yes  [] No Location:  N/A Pain Rating: (0-10 scale) 2-3/10  Pain altered Tx:  [x] No  [] Yes  Action:  Comments:    Objective:     Today's Treatment:  Modalities:   Precautions:  Exercise Reps/ Time Weight/ Level Comments    NuStep  8' L3             Standing  HS S 3x30\"      SB S  3x30\"      SKTC  3x30\"       Piriformis S  3x30\"       LTR 10x10\"      Supine hip flexor S 1' ea              Bridges  2x10 3\" OTB     SLR  2x10      SL clamshells  2x10  orange     SL hip abduction  2x10  orange            Modified Deadbug 2x10      TA sets  10x10\"      Palloff Press

## 2023-06-08 ENCOUNTER — HOSPITAL ENCOUNTER (OUTPATIENT)
Dept: PHYSICAL THERAPY | Facility: CLINIC | Age: 53
Setting detail: THERAPIES SERIES
Discharge: HOME | End: 2023-06-08
Payer: COMMERCIAL

## 2023-06-08 PROCEDURE — 97110 THERAPEUTIC EXERCISES: CPT

## 2023-06-08 PROCEDURE — 97530 THERAPEUTIC ACTIVITIES: CPT

## 2023-06-08 NOTE — DISCHARGE SUMMARY
[] [facility]  [address]  [phone]  F: [phone] [] [address]  [facility] 36   [address]  P: [phone]  F: [phone] [x] [place] [facility]  [address]  P: [phone]  F: [phone] [] [address]  P: [phone]  F: [phone] [] [address]  [facility]   [address]   [place]: [phone]  F: [phone]      Physical Therapy Daily Treatment Note/ Discharge Note    Date:  2023  Patient Name:  Suzy Nice    :  1970  MRN: [id number]  Physician: Lore Cevallos, APRN - CNP                                 Insurance: Katina Yoon ( Visits Approved, HARD MAX)  Medical Diagnosis: M54.50 (ICD-10-CM) - Lumbar pain      Rehab Codes: M54.5  Onset Date: 23                 Next 's appt.: 23, PCP and pain doctor   Visit# / total visits:     Cancels/No Shows: 0/0     Subjective: Pt arrives stating that she had some testing done on Tuesday that led to some increased pain and cramping in the backs of her legs, but otherwise same aches and pains she typically has. Pain:  [x] Yes  [] No  Location: Low back  Pain Rating: (0-10 scale) 2-3/10  Pain altered Tx:  [x] No  [] Yes  Action:  Comments:    Objective:     Today's Treatment:  Modalities:   Precautions:  Exercise Reps/ Time Weight/ Level Comments    NuStep  8' L3   x          Standing  HS S 3x30\"   x   SB S  3x30\"   x   SKTC  3x30\"    x   Piriformis S  3x30\"    x   LTR 10x10\"   x   Supine hip flexor S 1' ea              Bridges  2x10 3\" OTB  x   SLR  2x10      SL clamshells  2x10  orange  x   SL hip abduction  2x10  orange            Modified Deadbug 2x10      TA sets  10x10\"      Palloff Press  2x10  Red cord     TA+

## 2023-06-12 ENCOUNTER — APPOINTMENT (OUTPATIENT)
Dept: PHYSICAL THERAPY | Facility: CLINIC | Age: 53
End: 2023-06-12
Payer: COMMERCIAL

## 2023-06-15 ENCOUNTER — APPOINTMENT (OUTPATIENT)
Dept: PHYSICAL THERAPY | Facility: CLINIC | Age: 53
End: 2023-06-15
Payer: COMMERCIAL

## 2023-06-26 ENCOUNTER — E-VISIT (OUTPATIENT)
Dept: PRIMARY CARE CLINIC | Age: 53
End: 2023-06-26
Payer: COMMERCIAL

## 2023-06-26 DIAGNOSIS — R05.1 ACUTE COUGH: Primary | ICD-10-CM

## 2023-06-26 DIAGNOSIS — J06.9 UPPER RESPIRATORY TRACT INFECTION, UNSPECIFIED TYPE: Primary | ICD-10-CM

## 2023-06-26 PROCEDURE — 99422 OL DIG E/M SVC 11-20 MIN: CPT | Performed by: NURSE PRACTITIONER

## 2023-06-26 RX ORDER — METHYLPREDNISOLONE 4 MG/1
TABLET ORAL
Qty: 1 KIT | Refills: 0 | Status: SHIPPED | OUTPATIENT
Start: 2023-06-26 | End: 2023-07-02

## 2023-06-26 RX ORDER — AMOXICILLIN AND CLAVULANATE POTASSIUM 875; 125 MG/1; MG/1
1 TABLET, FILM COATED ORAL 2 TIMES DAILY
Qty: 20 TABLET | Refills: 0 | Status: SHIPPED | OUTPATIENT
Start: 2023-06-26 | End: 2023-07-06

## 2023-06-26 ASSESSMENT — LIFESTYLE VARIABLES: SMOKING_STATUS: NO, I'VE NEVER SMOKED

## 2023-07-27 ENCOUNTER — OFFICE VISIT (OUTPATIENT)
Dept: PRIMARY CARE CLINIC | Age: 53
End: 2023-07-27
Payer: COMMERCIAL

## 2023-07-27 ENCOUNTER — HOSPITAL ENCOUNTER (OUTPATIENT)
Dept: PAIN MANAGEMENT | Age: 53
Discharge: HOME | End: 2023-07-27
Payer: COMMERCIAL

## 2023-07-27 ENCOUNTER — HOSPITAL ENCOUNTER (OUTPATIENT)
Age: 53
Setting detail: SPECIMEN
Discharge: HOME | End: 2023-07-27

## 2023-07-27 VITALS
RESPIRATION RATE: 11 BRPM | WEIGHT: 166.8 LBS | DIASTOLIC BLOOD PRESSURE: 64 MMHG | SYSTOLIC BLOOD PRESSURE: 98 MMHG | OXYGEN SATURATION: 98 % | HEIGHT: 67 IN | BODY MASS INDEX: 26.18 KG/M2 | HEART RATE: 54 BPM

## 2023-07-27 VITALS — HEIGHT: 67 IN | TEMPERATURE: 97.3 F | BODY MASS INDEX: 26.06 KG/M2 | WEIGHT: 166 LBS

## 2023-07-27 DIAGNOSIS — M54.50 LUMBAR PAIN: ICD-10-CM

## 2023-07-27 DIAGNOSIS — Z12.31 ENCOUNTER FOR SCREENING MAMMOGRAM FOR MALIGNANT NEOPLASM OF BREAST: ICD-10-CM

## 2023-07-27 DIAGNOSIS — M47.817 SPONDYLOSIS OF LUMBOSACRAL SPINE WITHOUT MYELOPATHY: Primary | ICD-10-CM

## 2023-07-27 DIAGNOSIS — Z13.29 SCREENING FOR THYROID DISORDER: ICD-10-CM

## 2023-07-27 DIAGNOSIS — Z00.00 ENCOUNTER FOR GENERAL ADULT MEDICAL EXAMINATION W/O ABNORMAL FINDINGS: Primary | ICD-10-CM

## 2023-07-27 DIAGNOSIS — Z13.0 SCREENING FOR DEFICIENCY ANEMIA: ICD-10-CM

## 2023-07-27 DIAGNOSIS — Z13.220 SCREENING FOR LIPID DISORDERS: ICD-10-CM

## 2023-07-27 DIAGNOSIS — Z13.1 SCREENING FOR DIABETES MELLITUS: ICD-10-CM

## 2023-07-27 LAB
ALBUMIN SERPL-MCNC: 4.3 G/DL (ref 3.5–5.2)
ALBUMIN/GLOB SERPL: 1.6 {RATIO} (ref 1–2.5)
ALP SERPL-CCNC: 71 U/L (ref 35–104)
ALT SERPL-CCNC: 17 U/L (ref 5–33)
ANION GAP SERPL CALCULATED.3IONS-SCNC: 10 MMOL/L (ref 9–17)
AST SERPL-CCNC: 19 U/L
BILIRUB SERPL-MCNC: 0.4 MG/DL (ref 0.3–1.2)
BUN SERPL-MCNC: 19 MG/DL (ref 6–20)
CALCIUM SERPL-MCNC: 9.6 MG/DL (ref 8.6–10.4)
CHLORIDE SERPL-SCNC: 105 MMOL/L (ref 98–107)
CHOLEST SERPL-MCNC: 195 MG/DL
CHOLESTEROL/HDL RATIO: 3.4
CO2 SERPL-SCNC: 25 MMOL/L (ref 20–31)
CREAT SERPL-MCNC: 0.6 MG/DL (ref 0.5–0.9)
ERYTHROCYTE [DISTWIDTH] IN BLOOD BY AUTOMATED COUNT: 12.4 % (ref 11.8–14.4)
EST. AVERAGE GLUCOSE BLD GHB EST-MCNC: 100 MG/DL
GFR SERPL CREATININE-BSD FRML MDRD: >60 ML/MIN/1.73M2
GLUCOSE SERPL-MCNC: 93 MG/DL (ref 70–99)
HBA1C MFR BLD: 5.1 % (ref 4–6)
HCT VFR BLD AUTO: 42.1 % (ref 36.3–47.1)
HDLC SERPL-MCNC: 57 MG/DL
HGB BLD-MCNC: 13.5 G/DL (ref 11.9–15.1)
LDLC SERPL CALC-MCNC: 120 MG/DL (ref 0–130)
MCH RBC QN AUTO: 31 PG (ref 25.2–33.5)
MCHC RBC AUTO-ENTMCNC: 32.1 G/DL (ref 28.4–34.8)
MCV RBC AUTO: 96.8 FL (ref 82.6–102.9)
NRBC BLD-RTO: 0 PER 100 WBC
PLATELET # BLD AUTO: 320 K/UL (ref 138–453)
PMV BLD AUTO: 11.4 FL (ref 8.1–13.5)
POTASSIUM SERPL-SCNC: 4.7 MMOL/L (ref 3.7–5.3)
PROT SERPL-MCNC: 7 G/DL (ref 6.4–8.3)
RBC # BLD AUTO: 4.35 M/UL (ref 3.95–5.11)
SODIUM SERPL-SCNC: 140 MMOL/L (ref 135–144)
TRIGL SERPL-MCNC: 89 MG/DL
TSH SERPL DL<=0.05 MIU/L-ACNC: 2.23 UIU/ML (ref 0.3–5)
WBC OTHER # BLD: 7 K/UL (ref 3.5–11.3)

## 2023-07-27 PROCEDURE — 99213 OFFICE O/P EST LOW 20 MIN: CPT

## 2023-07-27 PROCEDURE — 99213 OFFICE O/P EST LOW 20 MIN: CPT | Performed by: NURSE PRACTITIONER

## 2023-07-27 PROCEDURE — 99396 PREV VISIT EST AGE 40-64: CPT | Performed by: NURSE PRACTITIONER

## 2023-07-27 RX ORDER — CYCLOBENZAPRINE HCL 10 MG
10 TABLET ORAL 3 TIMES DAILY PRN
Qty: 21 TABLET | Refills: 1 | Status: SHIPPED | OUTPATIENT
Start: 2023-07-27 | End: 2023-08-10

## 2023-07-27 ASSESSMENT — ENCOUNTER SYMPTOMS
SHORTNESS OF BREATH: 0
BOWEL INCONTINENCE: 0
ABDOMINAL PAIN: 0
BACK PAIN: 1
SHORTNESS OF BREATH: 0
COUGH: 0
BACK PAIN: 0
COUGH: 0
CONSTIPATION: 0

## 2023-07-27 ASSESSMENT — PATIENT HEALTH QUESTIONNAIRE - PHQ9
SUM OF ALL RESPONSES TO PHQ QUESTIONS 1-9: 0
SUM OF ALL RESPONSES TO PHQ9 QUESTIONS 1 & 2: 0
1. LITTLE INTEREST OR PLEASURE IN DOING THINGS: 0
SUM OF ALL RESPONSES TO PHQ QUESTIONS 1-9: 0
2. FEELING DOWN, DEPRESSED OR HOPELESS: 0
SUM OF ALL RESPONSES TO PHQ QUESTIONS 1-9: 0
SUM OF ALL RESPONSES TO PHQ QUESTIONS 1-9: 0

## 2023-07-27 ASSESSMENT — PAIN SCALES - GENERAL: PAINLEVEL_OUTOF10: 2

## 2023-07-27 NOTE — PROGRESS NOTES
Chief Complaint   Patient presents with    Back Pain         PMH  Pt complains of axial low back pain. MRI with Inferior endplate changes with Modic changes involving L3 endplate. Lower facet arthropathy at bilateral L3-4 and L4-5 facets. She was prescibed flexeril at last visit which she found helpful. Plan of care was to try lumbar MBB but patient states she is feeling much better following completion of PT. She continues with HEP and her pain is very well managed at this time. Back Pain  This is a chronic problem. The current episode started more than 1 year ago. The problem occurs intermittently. The problem is unchanged. The pain is present in the lumbar spine. The pain does not radiate. The pain is at a severity of 2/10. The pain is Worse during the day. The symptoms are aggravated by bending, sitting and standing. Pertinent negatives include no bladder incontinence, bowel incontinence, chest pain, fever, headaches, numbness, tingling or weakness. She has tried ice and heat for the symptoms. Patient denies any new neurological symptoms. No bowel or bladder incontinence, no weakness, and no falling. History reviewed. No pertinent past medical history.     Past Surgical History:   Procedure Laterality Date    COLONOSCOPY  12/09/2021    COLONOSCOPY N/A 12/9/2021    COLORECTAL CANCER SCREENING, NOT HIGH RISK performed by Bharti Mtz DO at [facility], TOTAL ABDOMINAL (CERVIX REMOVED)      left ovarier        No Known Allergies      Current Outpatient Medications:     ibuprofen (ADVIL;MOTRIN) 800 MG tablet, Take 1 tablet by mouth every 8 hours as needed for Pain, Disp: 90 tablet, Rfl: 1    scopolamine (TRANSDERM-SCOP, 1.5 MG,) transdermal patch, Place 1 patch onto the skin every 72 hours (Patient not taking: Reported on 7/27/2023), Disp: 10 patch, Rfl: 0    Acetaminophen (TYLENOL 8 HOUR PO), Take by mouth, Disp: , Rfl:     Fexofenadine HCl (ALLEGRA ALLERGY PO), Take by

## 2023-07-27 NOTE — PROGRESS NOTES
[address]  PRIMARY CARE  [place] [address]  Dept: [phone]  Dept Fax: [phone]    Bella Hendricks is a 47 y.o. female who presentstoday for her medical conditions/complaints as noted below. Bella Hendricks is c/o of  Chief Complaint   Patient presents with    Annual Exam         HPI:     Presents for annual exam  Has form to complete for employer  BP well controlled  Weight is stable    Willing to update annual labs  Mammogram up to date    Denies any problems/concerns      Hemoglobin A1C (%)   Date Value   07/21/2022 5.0   07/15/2021 5.2             ( goal A1C is < 7)   No components found for: LABMICR  LDL Cholesterol (mg/dL)   Date Value   07/21/2022 139 (H)   07/15/2021 128   08/28/2020 135 (H)     LDL Calculated (mg/dL)   Date Value   05/23/2013 117       (goal LDL is <100)   AST (U/L)   Date Value   07/21/2022 29     ALT (U/L)   Date Value   07/21/2022 24     BUN (mg/dL)   Date Value   07/21/2022 13     BP Readings from Last 3 Encounters:   07/27/23 98/64   06/01/23 132/75   05/25/23 122/74          ([id number])    History reviewed. No pertinent past medical history.    Past Surgical History:   Procedure Laterality Date    COLONOSCOPY  12/09/2021    COLONOSCOPY N/A 12/9/2021    COLORECTAL CANCER SCREENING, NOT HIGH RISK performed by Amado Saleem DO at [facility], TOTAL ABDOMINAL (CERVIX REMOVED)      left ovarier        Family History   Problem Relation Age of Onset    Anemia Mother     Asthma Mother     Cancer Mother         Multiple Myeloma    Brain Cancer Father     Cancer Father         Brain    Diabetes Maternal Grandfather     Obesity Maternal Grandmother     Heart Disease Paternal Grandfather     Cancer Paternal Grandmother         Breast    Asthma Brother     Cancer Paternal Cousin         Breast    Mental Illness Maternal Aunt         Bipolar    Mental Illness Maternal Uncle           Social

## 2023-08-08 ENCOUNTER — OFFICE VISIT (OUTPATIENT)
Dept: FAMILY MEDICINE CLINIC | Age: 53
End: 2023-08-08
Payer: COMMERCIAL

## 2023-08-08 VITALS
RESPIRATION RATE: 14 BRPM | HEART RATE: 68 BPM | BODY MASS INDEX: 27 KG/M2 | WEIGHT: 172 LBS | DIASTOLIC BLOOD PRESSURE: 70 MMHG | HEIGHT: 67 IN | OXYGEN SATURATION: 97 % | SYSTOLIC BLOOD PRESSURE: 116 MMHG | TEMPERATURE: 97.3 F

## 2023-08-08 DIAGNOSIS — T63.481A INSECT STINGS, ACCIDENTAL OR UNINTENTIONAL, INITIAL ENCOUNTER: Primary | ICD-10-CM

## 2023-08-08 PROCEDURE — 99213 OFFICE O/P EST LOW 20 MIN: CPT | Performed by: NURSE PRACTITIONER

## 2023-08-08 RX ORDER — PREDNISONE 20 MG/1
20 TABLET ORAL 2 TIMES DAILY
Qty: 10 TABLET | Refills: 0 | Status: SHIPPED | OUTPATIENT
Start: 2023-08-08 | End: 2023-08-13

## 2023-08-08 ASSESSMENT — ENCOUNTER SYMPTOMS
VOMITING: 0
SORE THROAT: 0
NAUSEA: 0
SWOLLEN GLANDS: 0

## 2023-08-08 NOTE — PROGRESS NOTES
[address] [facility] WALK-IN  [place] [id number] [address] [address]  Dept: [phone]  Dept Fax: [phone]    Hailey Santana is a 47 y.o. female who presents to the urgent care today for her medical conditions/complaints as notedbelow. Hailey Santana is c/o of Insect Bite (Happened 8/4/23 possible bee stung , )      HPI:     47 yr old female presents for bug bite from wasp or hornet. Incident occurred 4 days ago. Lying on raft when felt the sting. Rt FA. No change in appearance, remains red and itchy. Pt denies fevers, sob, takes allergy pills daily. Insect Bite  This is a new problem. The current episode started in the past 7 days (4d). The problem occurs constantly. The problem has been unchanged. Pertinent negatives include no anorexia, fever, nausea, sore throat, swollen glands or vomiting. Nothing aggravates the symptoms. Treatments tried: daily allergy med and topical antibiotic oint. The treatment provided no relief. No past medical history on file.      Current Outpatient Medications   Medication Sig Dispense Refill    predniSONE (DELTASONE) 20 MG tablet Take 1 tablet by mouth 2 times daily for 5 days 10 tablet 0    cyclobenzaprine (FLEXERIL) 10 MG tablet Take 1 tablet by mouth 3 times daily as needed for Muscle spasms 21 tablet 1    ibuprofen (ADVIL;MOTRIN) 800 MG tablet Take 1 tablet by mouth every 8 hours as needed for Pain 90 tablet 1    Acetaminophen (TYLENOL 8 HOUR PO) Take by mouth      Fexofenadine HCl (ALLEGRA ALLERGY PO) Take by mouth      vitamin C (ASCORBIC ACID) 500 MG tablet Take 1 tablet by mouth daily      VITAMIN D PO Take by mouth      Doxylamine Succinate, Sleep, (UNISOM PO) Take by mouth      scopolamine (TRANSDERM-SCOP, 1.5 MG,) transdermal patch Place 1 patch onto the skin every 72 hours (Patient not taking: Reported on 7/27/2023) 10 patch 0     No current facility-administered medications for

## 2023-09-21 ENCOUNTER — HOSPITAL ENCOUNTER (OUTPATIENT)
Dept: PAIN MANAGEMENT | Age: 53
Discharge: HOME | End: 2023-09-21
Payer: COMMERCIAL

## 2023-09-21 VITALS — WEIGHT: 172 LBS | TEMPERATURE: 97.3 F | HEIGHT: 67 IN | BODY MASS INDEX: 27 KG/M2

## 2023-09-21 DIAGNOSIS — M47.817 SPONDYLOSIS OF LUMBOSACRAL SPINE WITHOUT MYELOPATHY: Primary | ICD-10-CM

## 2023-09-21 DIAGNOSIS — M54.50 LUMBAR PAIN: ICD-10-CM

## 2023-09-21 PROCEDURE — 99213 OFFICE O/P EST LOW 20 MIN: CPT

## 2023-09-21 PROCEDURE — 99214 OFFICE O/P EST MOD 30 MIN: CPT | Performed by: NURSE PRACTITIONER

## 2023-09-21 RX ORDER — CYCLOBENZAPRINE HCL 10 MG
10 TABLET ORAL 3 TIMES DAILY PRN
Qty: 21 TABLET | Refills: 1 | Status: SHIPPED | OUTPATIENT
Start: 2023-09-21 | End: 2023-10-05

## 2023-09-21 ASSESSMENT — ENCOUNTER SYMPTOMS
COUGH: 0
BOWEL INCONTINENCE: 0
CONSTIPATION: 0
SHORTNESS OF BREATH: 0
BACK PAIN: 1

## 2023-09-21 ASSESSMENT — PAIN SCALES - GENERAL: PAINLEVEL_OUTOF10: 5

## 2023-09-21 NOTE — PROGRESS NOTES
Doxylamine Succinate, Sleep, (UNISOM PO), Take by mouth, Disp: , Rfl:     Family History   Problem Relation Age of Onset    Anemia Mother     Asthma Mother     Cancer Mother         Multiple Myeloma    Brain Cancer Father     Cancer Father         Brain    Diabetes Maternal Grandfather     Obesity Maternal Grandmother     Heart Disease Paternal Grandfather     Cancer Paternal Grandmother         Breast    Asthma Brother     Cancer Paternal Cousin         Breast    Mental Illness Maternal Aunt         Bipolar    Mental Illness Maternal Uncle        Social History     Socioeconomic History    Marital status:      Spouse name: Not on file    Number of children: Not on file    Years of education: Not on file    Highest education level: Not on file   Occupational History    Not on file   Tobacco Use    Smoking status: Never    Smokeless tobacco: Never   Substance and Sexual Activity    Alcohol use: Yes     Comment: Socially, dont drink that often. Drug use: No    Sexual activity: Yes     Partners: Male   Other Topics Concern    Not on file   Social History Narrative    Not on file     Social Determinants of Health     Financial Resource Strain: Low Risk  (4/10/2023)    Overall Financial Resource Strain (CARDIA)     Difficulty of Paying Living Expenses: Not hard at all   Food Insecurity: No Food Insecurity (4/10/2023)    Hunger Vital Sign     Worried About Running Out of Food in the Last Year: Never true     [address] in the Last Year: Never true   Transportation Needs: Unknown (4/10/2023)    PRAPARE - Transportation     Lack of Transportation (Medical): Not on file     Lack of Transportation (Non-Medical):  No   Physical Activity: Not on file   Stress: Not on file   Social Connections: Not on file   Intimate Partner Violence: Not on file   Housing Stability: Unknown (4/10/2023)    Housing Stability Vital Sign     Unable to Pay for Housing in the Last Year: Not on file     Number of Places Lived in the

## 2023-10-02 ENCOUNTER — HOSPITAL ENCOUNTER (OUTPATIENT)
Dept: PHYSICAL THERAPY | Facility: CLINIC | Age: 53
Setting detail: THERAPIES SERIES
Discharge: HOME | End: 2023-10-02
Payer: COMMERCIAL

## 2023-10-02 PROCEDURE — 97161 PT EVAL LOW COMPLEX 20 MIN: CPT

## 2023-10-02 PROCEDURE — 97110 THERAPEUTIC EXERCISES: CPT

## 2023-10-02 NOTE — CONSULTS
[] [address]  [address] [facility].  [phone]  F: [phone] [] 204 [facility]  [address]  P: [phone]  F: [phone] [x] [address]  [address] [facility]  P: [phone]  F: [phone] [] [facility]: [phone]  F: [phone] [] 224 [facility]  [address] [facility]   [address]   P: [phone]  F: [phone]  [] [address] [facility].   P: [phone]  F: [phone] [] 205 [facility]  [address]  P: [phone]  F: [phone] [] 224 [facility]  [address] [facility]  [place]: [phone]  F: [phone] [] [address]  [place]: [phone]  F: [phone]      Physical Therapy Spine Evaluation    Date:  10/2/2023  Patient: Angelica Wakefield  : 1970  MRN: [id number]  Physician: Pilar Persaud, APRN - CNP  Insurance: [facility] auth after eval  Medical Diagnosis:    M54.50 (ICD-10-CM) - Lumbar pain   M47.817 (ICD-10-CM) - Spondylosis of lumbosacral spine without myelopathy   Rehab Codes: M54.5, M62.81   Onset Date: 23  Next 's appt. : tbd      Subjective:   CC: low back pain  HPI:Patient recently discharged from therapy in 2023 and had successful reduction in pain. She reports going on vacation recently and sleeping on multi different beds and began having increased back pain. Began having R side LBP that is continuous dull aching with intermittent  sharp pain.  Pain localized to R side

## 2023-10-16 ENCOUNTER — HOSPITAL ENCOUNTER (OUTPATIENT)
Dept: PHYSICAL THERAPY | Facility: CLINIC | Age: 53
Setting detail: THERAPIES SERIES
Discharge: HOME | End: 2023-10-16
Payer: COMMERCIAL

## 2023-10-16 PROCEDURE — 97140 MANUAL THERAPY 1/> REGIONS: CPT

## 2023-10-16 PROCEDURE — 97110 THERAPEUTIC EXERCISES: CPT

## 2023-10-16 NOTE — FLOWSHEET NOTE
[] [address]  4600 [facility].  [phone]  F: [phone] [] 204 [facility]  [address]   [address]  P: [phone]  F: [phone] [x] [address]  151 [facility]  P: [phone]  F: [phone] [] [place]: [phone]  F: [phone] [] 224 [facility]  One [facility]   [address]   P: [phone]  F: [phone]  [] 7[address] [facility].   P: [phone]  F: [phone] [] 205 [facility]  [address] [facility] [address] [address]  P: [phone]  F: [phone] [] 224 [facility]  [address] [facility]  [place]: [phone]  F: [phone] [] [address] [place] [facility] [address]  [place]: [phone]  F: [phone]      Physical Therapy Daily Treatment Note    Date:  10/16/2023  Patient Name:  Stephanie Buitrago    :  1970  MRN: [id number]  Physician: JOHN Whitney CNP                   Insurance: Cornelious Severs after eval  Medical Diagnosis:     M54.50 (ICD-10-CM) - Lumbar pain   M47.817 (ICD-10-CM) - Spondylosis of lumbosacral spine without myelopathy   Rehab Codes: M54.5, M62.81            Onset Date: 23                 Next 's appt. : tbd    Visit# / total visits: 2/10 (1-2x wk)      Cancels/No Shows: 0/0     Subjective:    Pain:  [x] Yes  [] No Location: R side LB Pain Rating: (0-10 scale) 2/10  Pain altered Tx:  [x] No  [] Yes  Action:  Comments: Pt reports min discomfort but \"isn't that bad\".  She stated has not been compliant in her

## 2023-10-19 ENCOUNTER — E-VISIT (OUTPATIENT)
Dept: PRIMARY CARE CLINIC | Age: 53
End: 2023-10-19
Payer: COMMERCIAL

## 2023-10-19 DIAGNOSIS — M54.50 LUMBAR PAIN: Primary | ICD-10-CM

## 2023-10-19 PROCEDURE — 99422 OL DIG E/M SVC 11-20 MIN: CPT | Performed by: NURSE PRACTITIONER

## 2023-10-19 RX ORDER — IBUPROFEN 800 MG/1
800 TABLET ORAL
Qty: 90 TABLET | Refills: 0 | Status: SHIPPED | OUTPATIENT
Start: 2023-10-19

## 2023-10-19 RX ORDER — PREDNISONE 20 MG/1
TABLET ORAL
Qty: 18 TABLET | Refills: 0 | Status: SHIPPED | OUTPATIENT
Start: 2023-10-19 | End: 2023-10-29

## 2023-10-19 NOTE — PROGRESS NOTES
Reviewed questionnaire    Reviewed meds/allergies    Dx Lower back pain    Plan Rx given for prednisone and IBU, continue all other meds. Follow with pain management as planned.  Follow up in office if no improvement    Time spent on visit 11 min

## 2023-10-26 ENCOUNTER — HOSPITAL ENCOUNTER (OUTPATIENT)
Dept: PHYSICAL THERAPY | Facility: CLINIC | Age: 53
Setting detail: THERAPIES SERIES
Discharge: HOME | End: 2023-10-26
Payer: COMMERCIAL

## 2023-10-26 PROCEDURE — 97110 THERAPEUTIC EXERCISES: CPT

## 2023-10-26 NOTE — FLOWSHEET NOTE
[] [address]  4600 [facility].  [phone]  F: [phone] [] 204 [facility]  [address]  P: [phone]  F: [phone] [x] [address]  151 [facility]  P: [phone]  F: [phone] [] [facility]: [phone]  F: [phone] [] 224 [facility]  One [facility]   [address]   P: [phone]  F: [phone]  [] 7170 [facility].   P: [phone]  F: [phone] [] 205 [facility]  [address]  P: [phone]  F: [phone] [] 224 [facility]  7[address] [facility]  Jace Maykel: [phone]  F: [phone] [] [address] [place] [facility] [address]  Jace Maykel: [phone]  F: [phone]      Physical Therapy Daily Treatment Note    Date:  10/26/2023  Patient Name:  Hailey Santana    :  1970  MRN: [id number]  Physician: JOHN De Guzman - SHERIF                   Insurance: [facility] after al  Medical Diagnosis:     M54.50 (ICD-10-CM) - Lumbar pain   M47.817 (ICD-10-CM) - Spondylosis of lumbosacral spine without myelopathy   Rehab Codes: M54.5, M62.81            Onset Date: 23                 Next 's appt. : tbd    Visit# / total visits: 3/10 (1-2x wk)      Cancels/No Shows: 0/0     Subjective:    Pain:  [x] Yes  [] No Location: R side LB Pain Rating: (0-10 scale) 1/10  Pain altered Tx:  [x] No  [] Yes  Action:  Comments: Pt reports had strained her back last Tues when trying to handle her dogs to get in the

## 2023-10-30 ENCOUNTER — HOSPITAL ENCOUNTER (OUTPATIENT)
Dept: PHYSICAL THERAPY | Facility: CLINIC | Age: 53
Setting detail: THERAPIES SERIES
Discharge: HOME | End: 2023-10-30
Payer: COMMERCIAL

## 2023-10-30 PROCEDURE — 97110 THERAPEUTIC EXERCISES: CPT

## 2023-10-30 NOTE — FLOWSHEET NOTE
Gastroc Stretch on Wall  - 1 x daily - 7 x weekly - 3 sets - 30 hold  - Supine Piriformis Stretch with Foot on Ground  - 1 x daily - 7 x weekly - 3 sets - 30 hold  - Child's Pose Stretch  3-ways - 1 x daily - 7 x weekly - 3 sets - 30 hold  Plan: [] Continue current frequency toward long and short term goals.     [x] Specific Instructions for subsequent treatments:       Time In: 6:00pm           Time Out: 7:00pm    Electronically signed by:  Aurora Ahn PTA

## 2023-11-02 ENCOUNTER — HOSPITAL ENCOUNTER (OUTPATIENT)
Dept: PHYSICAL THERAPY | Facility: CLINIC | Age: 53
Setting detail: THERAPIES SERIES
Discharge: HOME | End: 2023-11-02
Payer: COMMERCIAL

## 2023-11-02 PROCEDURE — 97110 THERAPEUTIC EXERCISES: CPT

## 2023-11-06 ENCOUNTER — HOSPITAL ENCOUNTER (OUTPATIENT)
Dept: PHYSICAL THERAPY | Facility: CLINIC | Age: 53
Setting detail: THERAPIES SERIES
Discharge: HOME | End: 2023-11-06
Payer: COMMERCIAL

## 2023-11-06 PROCEDURE — 97110 THERAPEUTIC EXERCISES: CPT

## 2023-11-06 NOTE — FLOWSHEET NOTE
[] [address]  4600 [facility] [place] [facility].  [phone]  F: [phone] [] 204 [facility]  [address]   [address]  P: [phone]  F: [phone] [x] [address]  151 [facility]  P: [phone]  F: [phone] [] [facility]: [phone]  F: [phone] [] [address]  One [facility]   [address]   P: [phone]  F: [phone]  [] [address] [facility].   P: [phone]  F: [phone] [] 205 [facility]  [address] [place]  [address]  P: [phone]  F: [phone] [] [address]  [address] [facility]  [place]: [phone]  F: [phone] [] [address]  [place]: [phone]  F: [phone]      Physical Therapy Daily Treatment Note    Date:  2023  Patient Name:  Raj Massey    :  1970  MRN: [id number]  Physician: JOHN Morris CNP                   [facility]  Medical Diagnosis:     M54.50 (ICD-10-CM) - Lumbar pain   M47.817 (ICD-10-CM) - Spondylosis of lumbosacral spine without myelopathy   Rehab Codes: M54.5, M62.81            Onset Date: 23                 Next 's appt. : tbd    Visit# / total visits: 6/10 (1-2x wk)      Cancels/No Shows: 0/0     Subjective:    Pain:  [x] Yes  [] No Location: R side LB Pain Rating: (0-10 scale) 1/10  Pain altered Tx:  [x] No  [] Yes  Action:  Comments: Pt reported a little sore after last tx but reports no real pain just min present

## 2023-11-08 ENCOUNTER — HOSPITAL ENCOUNTER (OUTPATIENT)
Dept: PHYSICAL THERAPY | Facility: CLINIC | Age: 53
Setting detail: THERAPIES SERIES
Discharge: HOME | End: 2023-11-08
Payer: COMMERCIAL

## 2023-11-08 NOTE — FLOWSHEET NOTE
[] [address]  4600 [facility].  [phone]  F: [phone] [] 204 [facility]  [address]   [address]  P: [phone]  F: [phone] [x] [address]  151 [facility]  P: [phone]  F: [phone] [] Ronnie Mcneillie: [phone]  F: [phone] [] 224 [facility]  One [facility]   [address]   P: [phone]  F: [phone]  [] 7[address] [facility].   P: [phone]  F: [phone] [] 205 [facility]  [address] [facility]  P: [phone]  F: [phone] [] 224 [facility]  [address] [facility]  [place]: [phone]  F: [phone] [] [address]  [place]: [phone]  F: [phone]      Therapy Cancel/No Show note    Date: 2023  Patient: Fozia Marquez  : 1970  MRN: [id number]    Cancels/No Shows to date:     For today's appointment patient:    [x]  Cancelled    [] Rescheduled appointment    [] No-show     Reason given by patient:    []  Patient ill    []  Conflicting appointment    [] No transportation      [] Conflict with work    [] No reason given    [] Weather related    [] COVID-19    [] Other:      Comments:  Pt hurt her wrist at work and does not think she will be able to complete her therapy. Going to getting xrays. [x] Next appointment was confirmed    Electronically signed by:  Angelic Szymanski PTA

## 2023-11-10 ENCOUNTER — E-VISIT (OUTPATIENT)
Dept: PRIMARY CARE CLINIC | Age: 53
End: 2023-11-10

## 2023-11-10 DIAGNOSIS — J06.9 UPPER RESPIRATORY TRACT INFECTION, UNSPECIFIED TYPE: Primary | ICD-10-CM

## 2023-11-10 RX ORDER — METHYLPREDNISOLONE 4 MG/1
TABLET ORAL
Qty: 1 KIT | Refills: 0 | Status: SHIPPED | OUTPATIENT
Start: 2023-11-10 | End: 2023-11-16

## 2023-11-10 RX ORDER — AMOXICILLIN AND CLAVULANATE POTASSIUM 875; 125 MG/1; MG/1
1 TABLET, FILM COATED ORAL 2 TIMES DAILY
Qty: 20 TABLET | Refills: 0 | Status: SHIPPED | OUTPATIENT
Start: 2023-11-10 | End: 2023-11-20

## 2023-11-11 NOTE — PROGRESS NOTES
Reviewed questionnaire    Reviewed meds/allergies    Dx URI    Plan Rx given for augmentin and medrol dose pack.  Follow up in office if no improvement    Time spent on visit 11 min

## 2023-11-13 ENCOUNTER — HOSPITAL ENCOUNTER (OUTPATIENT)
Dept: PHYSICAL THERAPY | Facility: CLINIC | Age: 53
Setting detail: THERAPIES SERIES
Discharge: HOME | End: 2023-11-13
Payer: COMMERCIAL

## 2023-11-13 NOTE — FLOWSHEET NOTE
[] [address]  4600 [facility] [place] [facility].  [phone]  F: [phone] [] 204 [facility]  [address]   [address]  P: [phone]  F: [phone] [] [address]  151 [facility]  P: [phone]  F: [phone] [] [place]: [phone]  F: [phone] [] 224 [facility]  One [facility]   [address]   P: [phone]  F: [phone]  [] 7[address] [facility].   P: [phone]  F: [phone] [] 205 [facility]  [address] [facility].   [address]  P: [phone]  F: [phone] [] 224 [facility]  [address] [facility]  [place]: [phone]  F: [phone] [] [address]  [place]: [phone]  F: [phone]      Therapy Cancel/No Show note    Date: 2023  Patient: Stephanie Buitrago  : 1970  MRN: [id number]    Cancels/No Shows to date:     For today's appointment patient:    [x]  Cancelled    [] Rescheduled appointment    [] No-show     Reason given by patient:    [x]  Patient ill    []  Conflicting appointment    [] No transportation      [] Conflict with work    [] No reason given    [] Weather related    [] [id number]    [] Other:      Comments:        [x] Next appointment was confirmed    Electronically signed by: Lauren Velazquez PTA

## 2023-11-14 NOTE — FLOWSHEET NOTE
[] [address]  4600 [facility].  [phone]  F: [phone] [] 204 [facility]  [address]   [address]  P: [phone]  F: [phone] [x] [address]  151 [facility]  P: [phone]  F: [phone] [] Ronnie [place]ne[place]: [phone]  F: [phone] [] 224 [facility]  One [facility]   [address]   P: [phone]  F: [phone]  [] 7170 [facility].   P: [phone]  F: [phone] [] 205 [facility]  [address]  P: [phone]  F: [phone] [] 224 [facility]  [address] [facility]  [place]: [phone]  F: [phone] [] [address]  [place]: [phone]  F: [phone]      Therapy Cancel/No Show note    Date: 2023  Patient: Fozia Marquez  : 1970  MRN: [id number]    Cancels/No Shows to date: 20    For today's appointment patient:    [x]  Cancelled    [] Rescheduled appointment    [] No-show     Reason given by patient:    []  Patient ill    []  Conflicting appointment    [] No transportation      [] Conflict with work    [x] No reason given    [] Weather related    [] [id number]    [] Other:      Comments:        [] Next appointment was confirmed    Electronically signed by: Lauren Lees PT

## 2023-11-15 ENCOUNTER — HOSPITAL ENCOUNTER (OUTPATIENT)
Dept: PHYSICAL THERAPY | Facility: CLINIC | Age: 53
Setting detail: THERAPIES SERIES
Discharge: HOME | End: 2023-11-15
Payer: COMMERCIAL

## 2023-11-20 ENCOUNTER — HOSPITAL ENCOUNTER (OUTPATIENT)
Dept: PHYSICAL THERAPY | Facility: CLINIC | Age: 53
Setting detail: THERAPIES SERIES
Discharge: HOME | End: 2023-11-20
Payer: COMMERCIAL

## 2023-11-20 PROCEDURE — 97110 THERAPEUTIC EXERCISES: CPT

## 2023-11-20 NOTE — FLOWSHEET NOTE
[] [address]  4600 [facility] [place] [facility].  [phone]  F: [phone] [] 204 [facility]  [address]   [address]  P: [phone]  F: [phone] [x] [address]  151 [facility]  P: [phone]  F: [phone] [] [place]: [phone]  F: [phone] [] 224 [facility]  One [facility]   [address]   P: [phone]  F: [phone]  [] 7170 [facility].   P: [phone]  F: [phone] [] 205 [facility]  [address] [place]  [address]  P: [phone]  F: [phone] [] 224 [facility]  [address] [facility]  [place]: [phone]  F: [phone] [] [address]  [place]: [phone]  F: [phone]      Physical Therapy Daily Treatment Note    Date:  2023  Patient Name:  Angelica Wakefield    :  1970  MRN: [id number]  Physician: JOHN Guerrero - SHERIF                   Insurance: [employer] PPP-17 vs remain, hard max  Medical Diagnosis:     M54.50 (ICD-10-CM) - Lumbar pain   M47.817 (ICD-10-CM) - Spondylosis of lumbosacral spine without myelopathy   Rehab Codes: M54.5, M62.81            Onset Date: 23                 Next 's appt. : tbd  Visit# / total visits: 10 (1-2x wk)      Cancels/No Shows: 0/0     Subjective:    Pain:  [] Yes  [x] No Location:  Pain Rating: (0-10 scale) 0 /10  Pain altered Tx:  [x] No  [] Yes  Action:  Comments: Patient reports no back pain. She states she has been ill and has not been able to do HEP.

## 2023-12-26 RX ORDER — IBUPROFEN 800 MG/1
TABLET ORAL
Qty: 90 TABLET | Refills: 0 | Status: SHIPPED | OUTPATIENT
Start: 2023-12-26

## 2024-01-15 ENCOUNTER — CLINICAL DOCUMENTATION (OUTPATIENT)
Dept: PHYSICAL THERAPY | Facility: CLINIC | Age: 54
End: 2024-01-15

## 2024-01-15 NOTE — THERAPY DISCHARGE
[] [facility]  Outpatient Rehabilitation &  Therapy  [address]  [phone]  F: [phone] [] [facility]  Outpatient Rehabilitation &  Therapy  [address]  P: [phone]  F: [phone] [x] [facility]  Outpatient Rehabilitation &  Therapy  [address]  P: [phone]  F: [phone] [] [facility]  Outpatient Rehabilitation &  Therapy  [address]  P: [phone]  F: [phone] [] [facility]  Outpatient Rehabilitation &  Therapy  [address]   P: [phone]  F: [phone]  [] [facility]  Out[facility] &  Therapy  [address]   P: [phone]  F: [phone] [] [facility]  Outpatient Rehabilitation &  Therapy  [address]  [address]  P: [phone]  F: [phone] [] [facility]  Outpatient Rehabilitation &  Therapy  [address] [facility]  [address]  P: [phone]  F: [phone] [] [facility]  Outpatient Rehabilitation &  Therapy  [address] [facility] [address]  P: [phone]  F: [phone]      Physical Therapy Discharge Note    Date: 1/15/2024      Patient: Britni Acevedo  : 1970  MRN: [id number]  Physician: JOHN Mcdowell - CNP                   Insurance: [facility] PPP-17 vs remain, hard max  Medical Diagnosis:     M54.50 (ICD-10-CM) - Lumbar pain   M47.817 (ICD-10-CM) - Spondylosis of lumbosacral spine without myelopathy   Rehab Codes: M54.5, M62.81            Onset Date: 23                 Next 's appt.: tbd  Visit# / total visits: 7/10 (1-2x wk)                  Cancels/No Shows: 0/0   Date of initial visit: 10/2/23                Date of final visit: 23      Treatment to Date:  [x] Therapeutic Exercise    [] Modalities:  [x] Therapeutic Activity    [] Ultrasound  [] Electrical Stimulation  [x] Gait

## 2024-02-07 RX ORDER — IBUPROFEN 800 MG/1
TABLET ORAL
Qty: 90 TABLET | Refills: 0 | Status: SHIPPED | OUTPATIENT
Start: 2024-02-07

## 2024-03-11 RX ORDER — IBUPROFEN 800 MG/1
TABLET ORAL
Qty: 90 TABLET | Refills: 0 | Status: SHIPPED | OUTPATIENT
Start: 2024-03-11

## 2024-07-24 ASSESSMENT — PATIENT HEALTH QUESTIONNAIRE - PHQ9
3. TROUBLE FALLING OR STAYING ASLEEP: NEARLY EVERY DAY
7. TROUBLE CONCENTRATING ON THINGS, SUCH AS READING THE NEWSPAPER OR WATCHING TELEVISION: NOT AT ALL
6. FEELING BAD ABOUT YOURSELF - OR THAT YOU ARE A FAILURE OR HAVE LET YOURSELF OR YOUR FAMILY DOWN: SEVERAL DAYS
SUM OF ALL RESPONSES TO PHQ QUESTIONS 1-9: 16
1. LITTLE INTEREST OR PLEASURE IN DOING THINGS: NEARLY EVERY DAY
9. THOUGHTS THAT YOU WOULD BE BETTER OFF DEAD, OR OF HURTING YOURSELF: NOT AT ALL
7. TROUBLE CONCENTRATING ON THINGS, SUCH AS READING THE NEWSPAPER OR WATCHING TELEVISION: NOT AT ALL
6. FEELING BAD ABOUT YOURSELF - OR THAT YOU ARE A FAILURE OR HAVE LET YOURSELF OR YOUR FAMILY DOWN: SEVERAL DAYS
SUM OF ALL RESPONSES TO PHQ9 QUESTIONS 1 & 2: 6
5. POOR APPETITE OR OVEREATING: NEARLY EVERY DAY
SUM OF ALL RESPONSES TO PHQ QUESTIONS 1-9: 16
SUM OF ALL RESPONSES TO PHQ QUESTIONS 1-9: 16
3. TROUBLE FALLING OR STAYING ASLEEP: NEARLY EVERY DAY
5. POOR APPETITE OR OVEREATING: NEARLY EVERY DAY
1. LITTLE INTEREST OR PLEASURE IN DOING THINGS: NEARLY EVERY DAY
SUM OF ALL RESPONSES TO PHQ QUESTIONS 1-9: 16
10. IF YOU CHECKED OFF ANY PROBLEMS, HOW DIFFICULT HAVE THESE PROBLEMS MADE IT FOR YOU TO DO YOUR WORK, TAKE CARE OF THINGS AT HOME, OR GET ALONG WITH OTHER PEOPLE: NOT DIFFICULT AT ALL
SUM OF ALL RESPONSES TO PHQ9 QUESTIONS 1 & 2: 6
4. FEELING TIRED OR HAVING LITTLE ENERGY: NEARLY EVERY DAY
10. IF YOU CHECKED OFF ANY PROBLEMS, HOW DIFFICULT HAVE THESE PROBLEMS MADE IT FOR YOU TO DO YOUR WORK, TAKE CARE OF THINGS AT HOME, OR GET ALONG WITH OTHER PEOPLE: NOT DIFFICULT AT ALL
8. MOVING OR SPEAKING SO SLOWLY THAT OTHER PEOPLE COULD HAVE NOTICED. OR THE OPPOSITE - BEING SO FIDGETY OR RESTLESS THAT YOU HAVE BEEN MOVING AROUND A LOT MORE THAN USUAL: NOT AT ALL
2. FEELING DOWN, DEPRESSED OR HOPELESS: NEARLY EVERY DAY
8. MOVING OR SPEAKING SO SLOWLY THAT OTHER PEOPLE COULD HAVE NOTICED. OR THE OPPOSITE, BEING SO FIGETY OR RESTLESS THAT YOU HAVE BEEN MOVING AROUND A LOT MORE THAN USUAL: NOT AT ALL
9. THOUGHTS THAT YOU WOULD BE BETTER OFF DEAD, OR OF HURTING YOURSELF: NOT AT ALL
4. FEELING TIRED OR HAVING LITTLE ENERGY: NEARLY EVERY DAY
2. FEELING DOWN, DEPRESSED OR HOPELESS: NEARLY EVERY DAY
SUM OF ALL RESPONSES TO PHQ QUESTIONS 1-9: 16

## 2024-07-29 ENCOUNTER — HOSPITAL ENCOUNTER (OUTPATIENT)
Age: 54
Setting detail: SPECIMEN
Discharge: HOME | End: 2024-07-29

## 2024-07-29 ENCOUNTER — OFFICE VISIT (OUTPATIENT)
Dept: PRIMARY CARE CLINIC | Age: 54
End: 2024-07-29
Payer: COMMERCIAL

## 2024-07-29 VITALS
OXYGEN SATURATION: 97 % | WEIGHT: 176.4 LBS | HEART RATE: 54 BPM | SYSTOLIC BLOOD PRESSURE: 118 MMHG | HEIGHT: 66 IN | RESPIRATION RATE: 14 BRPM | DIASTOLIC BLOOD PRESSURE: 78 MMHG | BODY MASS INDEX: 28.35 KG/M2

## 2024-07-29 DIAGNOSIS — Z13.220 SCREENING FOR LIPID DISORDERS: ICD-10-CM

## 2024-07-29 DIAGNOSIS — N92.6 IRREGULAR MENSES: ICD-10-CM

## 2024-07-29 DIAGNOSIS — Z13.1 SCREENING FOR DIABETES MELLITUS: ICD-10-CM

## 2024-07-29 DIAGNOSIS — Z13.0 SCREENING FOR DEFICIENCY ANEMIA: ICD-10-CM

## 2024-07-29 DIAGNOSIS — Z13.29 SCREENING FOR THYROID DISORDER: ICD-10-CM

## 2024-07-29 DIAGNOSIS — Z00.00 ENCOUNTER FOR GENERAL ADULT MEDICAL EXAMINATION W/O ABNORMAL FINDINGS: Primary | ICD-10-CM

## 2024-07-29 LAB
ALBUMIN SERPL-MCNC: 4.5 G/DL (ref 3.5–5.2)
ALBUMIN/GLOB SERPL: 2 {RATIO} (ref 1–2.5)
ALP SERPL-CCNC: 72 U/L (ref 35–104)
ALT SERPL-CCNC: 18 U/L (ref 10–35)
ANION GAP SERPL CALCULATED.3IONS-SCNC: 9 MMOL/L (ref 9–16)
AST SERPL-CCNC: 22 U/L (ref 10–35)
BILIRUB SERPL-MCNC: 0.3 MG/DL (ref 0–1.2)
BUN SERPL-MCNC: 15 MG/DL (ref 6–20)
CALCIUM SERPL-MCNC: 9.8 MG/DL (ref 8.6–10.4)
CHLORIDE SERPL-SCNC: 108 MMOL/L (ref 98–107)
CHOLEST SERPL-MCNC: 202 MG/DL (ref 0–199)
CHOLESTEROL/HDL RATIO: 4
CO2 SERPL-SCNC: 27 MMOL/L (ref 20–31)
CREAT SERPL-MCNC: 0.8 MG/DL (ref 0.5–0.9)
ERYTHROCYTE [DISTWIDTH] IN BLOOD BY AUTOMATED COUNT: 12.6 % (ref 11.8–14.4)
EST. AVERAGE GLUCOSE BLD GHB EST-MCNC: 97 MG/DL
FSH SERPL-ACNC: 76.4 MIU/ML
GFR, ESTIMATED: >90 ML/MIN/1.73M2
GLUCOSE SERPL-MCNC: 94 MG/DL (ref 74–99)
HBA1C MFR BLD: 5 % (ref 4–6)
HCT VFR BLD AUTO: 41.2 % (ref 36.3–47.1)
HDLC SERPL-MCNC: 52 MG/DL
HGB BLD-MCNC: 13.4 G/DL (ref 11.9–15.1)
LDLC SERPL CALC-MCNC: 116 MG/DL (ref 0–100)
LH SERPL-ACNC: 42.4 MIU/ML (ref 1.7–8.6)
MCH RBC QN AUTO: 31.2 PG (ref 25.2–33.5)
MCHC RBC AUTO-ENTMCNC: 32.5 G/DL (ref 28.4–34.8)
MCV RBC AUTO: 95.8 FL (ref 82.6–102.9)
NRBC BLD-RTO: 0 PER 100 WBC
PLATELET # BLD AUTO: 144 K/UL (ref 138–453)
PMV BLD AUTO: 11.8 FL (ref 8.1–13.5)
POTASSIUM SERPL-SCNC: 4.9 MMOL/L (ref 3.7–5.3)
PROT SERPL-MCNC: 6.6 G/DL (ref 6.6–8.7)
RBC # BLD AUTO: 4.3 M/UL (ref 3.95–5.11)
SODIUM SERPL-SCNC: 144 MMOL/L (ref 136–145)
T4 FREE SERPL-MCNC: 1.2 NG/DL (ref 0.92–1.68)
TRIGL SERPL-MCNC: 169 MG/DL
TSH SERPL DL<=0.05 MIU/L-ACNC: 4.35 UIU/ML (ref 0.27–4.2)
VLDLC SERPL CALC-MCNC: 34 MG/DL
WBC OTHER # BLD: 7.5 K/UL (ref 3.5–11.3)

## 2024-07-29 PROCEDURE — 99396 PREV VISIT EST AGE 40-64: CPT | Performed by: NURSE PRACTITIONER

## 2024-07-29 RX ORDER — IBUPROFEN 800 MG/1
TABLET ORAL
Qty: 90 TABLET | Refills: 3 | Status: SHIPPED | OUTPATIENT
Start: 2024-07-29

## 2024-07-29 RX ORDER — BUPROPION HYDROCHLORIDE 75 MG/1
75 TABLET ORAL 2 TIMES DAILY
Qty: 60 TABLET | Refills: 3 | Status: SHIPPED | OUTPATIENT
Start: 2024-07-29

## 2024-07-29 SDOH — ECONOMIC STABILITY: INCOME INSECURITY: HOW HARD IS IT FOR YOU TO PAY FOR THE VERY BASICS LIKE FOOD, HOUSING, MEDICAL CARE, AND HEATING?: NOT HARD AT ALL

## 2024-07-29 SDOH — ECONOMIC STABILITY: FOOD INSECURITY: WITHIN THE PAST 12 MONTHS, YOU WORRIED THAT YOUR FOOD WOULD RUN OUT BEFORE YOU GOT MONEY TO BUY MORE.: NEVER TRUE

## 2024-07-29 SDOH — ECONOMIC STABILITY: HOUSING INSECURITY
IN THE LAST 12 MONTHS, WAS THERE A TIME WHEN YOU DID NOT HAVE A STEADY PLACE TO SLEEP OR SLEPT IN A SHELTER (INCLUDING NOW)?: NO

## 2024-07-29 SDOH — ECONOMIC STABILITY: FOOD INSECURITY: WITHIN THE PAST 12 MONTHS, THE FOOD YOU BOUGHT JUST DIDN'T LAST AND YOU DIDN'T HAVE MONEY TO GET MORE.: NEVER TRUE

## 2024-07-29 ASSESSMENT — ENCOUNTER SYMPTOMS
SHORTNESS OF BREATH: 0
ABDOMINAL PAIN: 0
BACK PAIN: 0
COUGH: 0

## 2024-07-29 NOTE — PROGRESS NOTES
tablet by mouth 2 times daily     Dispense:  60 tablet     Refill:  3       Patient given educational materials - see patient instructions.Discussed use, benefit, and side effects of prescribed medications.  All patientquestions answered. Pt voiced understanding. Reviewed health maintenance.  Instructedto continue current medications, diet and exercise.  Patient agreed with treatmentplan. Follow up as directed.     Electronicallysigned by JOHN ALVARADO CNP on 7/29/2024 at 8:13 AM

## 2024-07-31 ENCOUNTER — SCHEDULED TELEPHONE ENCOUNTER (OUTPATIENT)
Dept: PRIMARY CARE CLINIC | Age: 54
End: 2024-07-31

## 2024-07-31 DIAGNOSIS — E03.9 HYPOTHYROIDISM, UNSPECIFIED TYPE: Primary | ICD-10-CM

## 2024-07-31 RX ORDER — LEVOTHYROXINE SODIUM 0.03 MG/1
25 TABLET ORAL DAILY
Qty: 90 TABLET | Refills: 0 | Status: SHIPPED | OUTPATIENT
Start: 2024-07-31

## 2024-07-31 NOTE — PROGRESS NOTES
Presents via telephone to review labs  Unable to review vitals    TSH 4.35  Has been symptomatic with fatigue and weight gain  Reviewed use of levothyroxine- take med on empty stomach in am  Willing to start medication  Will repeat labs in 3 months    The 10-year ASCVD risk score (Semaj ZHOU, et al., 2019) is: 1.4%    Values used to calculate the score:      Age: 53 years      Sex: Female      Is Non- : No      Diabetic: No      Tobacco smoker: No      Systolic Blood Pressure: 118 mmHg      Is BP treated: No      HDL Cholesterol: 52 mg/dL      Total Cholesterol: 202 mg/dL  Will continue diet/exercise    Needs form completed for employer  Estrogen is pending    Denies any other problems/concerns    Follow up as planned in one year    Documentation:  I communicated with the patient and/or health care decision maker about see above.   Details of this discussion including any medical advice provided: see above    Total Time: minutes: 11-20 minutes    Britni Acevedo was evaluated through a synchronous (real-time) audio encounter. Patient identification was verified at the start of the visit. She (or guardian if applicable) is aware that this is a billable service, which includes applicable co-pays. This visit was conducted with the patient's (and/or legal guardian's) verbal consent. She has not had a related appointment within my department in the past 7 days or scheduled within the next 24 hours.   The patient was located at Home: [address] [place] [address].  The provider was located at Facility (Appt Dept): [address] [place] [address].    Note: not billable if this call serves to triage the patient into an appointment for the relevant concern  Yes, I confirm.   Britni Acevedo is a 53 y.o. female evaluated via telephone on 7/31/2024 for [facility]iscuss [facility]  .        SANTANA ROSAS, JOHN - CNP

## 2024-08-01 LAB
ESTRADIOL LEVEL: 4.6 PG/ML
ESTROGEN TOTAL: 22.9 PG/ML
ESTRONE SERPL-MCNC: 18.3 PG/ML

## 2024-10-24 DIAGNOSIS — E03.9 HYPOTHYROIDISM, UNSPECIFIED TYPE: ICD-10-CM

## 2024-10-24 RX ORDER — LEVOTHYROXINE SODIUM 25 UG/1
25 TABLET ORAL DAILY
Qty: 90 TABLET | Refills: 0 | Status: SHIPPED | OUTPATIENT
Start: 2024-10-24 | End: 2024-10-26

## 2024-10-24 NOTE — TELEPHONE ENCOUNTER
Patient Comment: I feel this is helping me with not feeling so tired. I should continue taking this pill for my thyroid.

## 2024-10-25 RX ORDER — BUPROPION HYDROCHLORIDE 75 MG/1
75 TABLET ORAL 2 TIMES DAILY
Qty: 60 TABLET | Refills: 3 | Status: SHIPPED | OUTPATIENT
Start: 2024-10-25

## 2024-10-26 DIAGNOSIS — E03.9 HYPOTHYROIDISM, UNSPECIFIED TYPE: ICD-10-CM

## 2024-10-26 RX ORDER — LEVOTHYROXINE SODIUM 25 UG/1
25 TABLET ORAL DAILY
Qty: 90 TABLET | Refills: 0 | Status: SHIPPED | OUTPATIENT
Start: 2024-10-26

## 2024-12-02 RX ORDER — IBUPROFEN 800 MG/1
TABLET, FILM COATED ORAL
Qty: 90 TABLET | Refills: 3 | Status: SHIPPED | OUTPATIENT
Start: 2024-12-02

## 2025-01-23 DIAGNOSIS — E03.9 HYPOTHYROIDISM, UNSPECIFIED TYPE: ICD-10-CM

## 2025-01-23 RX ORDER — LEVOTHYROXINE SODIUM 25 UG/1
25 TABLET ORAL DAILY
Qty: 90 TABLET | Refills: 0 | Status: SHIPPED | OUTPATIENT
Start: 2025-01-23

## 2025-03-05 SDOH — ECONOMIC STABILITY: FOOD INSECURITY: WITHIN THE PAST 12 MONTHS, THE FOOD YOU BOUGHT JUST DIDN'T LAST AND YOU DIDN'T HAVE MONEY TO GET MORE.: NEVER TRUE

## 2025-03-05 SDOH — ECONOMIC STABILITY: TRANSPORTATION INSECURITY
IN THE PAST 12 MONTHS, HAS LACK OF TRANSPORTATION KEPT YOU FROM MEETINGS, WORK, OR FROM GETTING THINGS NEEDED FOR DAILY LIVING?: NO

## 2025-03-05 SDOH — ECONOMIC STABILITY: INCOME INSECURITY: IN THE LAST 12 MONTHS, WAS THERE A TIME WHEN YOU WERE NOT ABLE TO PAY THE MORTGAGE OR RENT ON TIME?: NO

## 2025-03-05 SDOH — ECONOMIC STABILITY: FOOD INSECURITY: WITHIN THE PAST 12 MONTHS, YOU WORRIED THAT YOUR FOOD WOULD RUN OUT BEFORE YOU GOT MONEY TO BUY MORE.: NEVER TRUE

## 2025-03-05 SDOH — ECONOMIC STABILITY: TRANSPORTATION INSECURITY
IN THE PAST 12 MONTHS, HAS THE LACK OF TRANSPORTATION KEPT YOU FROM MEDICAL APPOINTMENTS OR FROM GETTING MEDICATIONS?: NO

## 2025-03-05 ASSESSMENT — PATIENT HEALTH QUESTIONNAIRE - PHQ9
7. TROUBLE CONCENTRATING ON THINGS, SUCH AS READING THE NEWSPAPER OR WATCHING TELEVISION: NOT AT ALL
5. POOR APPETITE OR OVEREATING: NEARLY EVERY DAY
9. THOUGHTS THAT YOU WOULD BE BETTER OFF DEAD, OR OF HURTING YOURSELF: NOT AT ALL
2. FEELING DOWN, DEPRESSED OR HOPELESS: SEVERAL DAYS
1. LITTLE INTEREST OR PLEASURE IN DOING THINGS: SEVERAL DAYS
SUM OF ALL RESPONSES TO PHQ QUESTIONS 1-9: 7
2. FEELING DOWN, DEPRESSED OR HOPELESS: SEVERAL DAYS
SUM OF ALL RESPONSES TO PHQ QUESTIONS 1-9: 7
6. FEELING BAD ABOUT YOURSELF - OR THAT YOU ARE A FAILURE OR HAVE LET YOURSELF OR YOUR FAMILY DOWN: SEVERAL DAYS
SUM OF ALL RESPONSES TO PHQ QUESTIONS 1-9: 2
2. FEELING DOWN, DEPRESSED OR HOPELESS: SEVERAL DAYS
SUM OF ALL RESPONSES TO PHQ QUESTIONS 1-9: 7
SUM OF ALL RESPONSES TO PHQ QUESTIONS 1-9: 2
7. TROUBLE CONCENTRATING ON THINGS, SUCH AS READING THE NEWSPAPER OR WATCHING TELEVISION: NOT AT ALL
SUM OF ALL RESPONSES TO PHQ QUESTIONS 1-9: 2
5. POOR APPETITE OR OVEREATING: NEARLY EVERY DAY
8. MOVING OR SPEAKING SO SLOWLY THAT OTHER PEOPLE COULD HAVE NOTICED. OR THE OPPOSITE - BEING SO FIDGETY OR RESTLESS THAT YOU HAVE BEEN MOVING AROUND A LOT MORE THAN USUAL: NOT AT ALL
SUM OF ALL RESPONSES TO PHQ QUESTIONS 1-9: 7
4. FEELING TIRED OR HAVING LITTLE ENERGY: NOT AT ALL
6. FEELING BAD ABOUT YOURSELF - OR THAT YOU ARE A FAILURE OR HAVE LET YOURSELF OR YOUR FAMILY DOWN: SEVERAL DAYS
10. IF YOU CHECKED OFF ANY PROBLEMS, HOW DIFFICULT HAVE THESE PROBLEMS MADE IT FOR YOU TO DO YOUR WORK, TAKE CARE OF THINGS AT HOME, OR GET ALONG WITH OTHER PEOPLE: SOMEWHAT DIFFICULT
3. TROUBLE FALLING OR STAYING ASLEEP: SEVERAL DAYS
1. LITTLE INTEREST OR PLEASURE IN DOING THINGS: SEVERAL DAYS
9. THOUGHTS THAT YOU WOULD BE BETTER OFF DEAD, OR OF HURTING YOURSELF: NOT AT ALL
SUM OF ALL RESPONSES TO PHQ QUESTIONS 1-9: 7
4. FEELING TIRED OR HAVING LITTLE ENERGY: NOT AT ALL
3. TROUBLE FALLING OR STAYING ASLEEP: SEVERAL DAYS
SUM OF ALL RESPONSES TO PHQ9 QUESTIONS 1 & 2: 2
10. IF YOU CHECKED OFF ANY PROBLEMS, HOW DIFFICULT HAVE THESE PROBLEMS MADE IT FOR YOU TO DO YOUR WORK, TAKE CARE OF THINGS AT HOME, OR GET ALONG WITH OTHER PEOPLE: SOMEWHAT DIFFICULT
SUM OF ALL RESPONSES TO PHQ QUESTIONS 1-9: 2
8. MOVING OR SPEAKING SO SLOWLY THAT OTHER PEOPLE COULD HAVE NOTICED. OR THE OPPOSITE, BEING SO FIGETY OR RESTLESS THAT YOU HAVE BEEN MOVING AROUND A LOT MORE THAN USUAL: NOT AT ALL
1. LITTLE INTEREST OR PLEASURE IN DOING THINGS: SEVERAL DAYS

## 2025-03-06 ENCOUNTER — HOSPITAL ENCOUNTER (OUTPATIENT)
Age: 55
Setting detail: SPECIMEN
Discharge: HOME | End: 2025-03-06

## 2025-03-06 ENCOUNTER — OFFICE VISIT (OUTPATIENT)
Dept: PRIMARY CARE CLINIC | Age: 55
End: 2025-03-06

## 2025-03-06 VITALS
DIASTOLIC BLOOD PRESSURE: 76 MMHG | HEIGHT: 67 IN | WEIGHT: 180.2 LBS | OXYGEN SATURATION: 98 % | HEART RATE: 58 BPM | SYSTOLIC BLOOD PRESSURE: 116 MMHG | BODY MASS INDEX: 28.28 KG/M2

## 2025-03-06 DIAGNOSIS — E66.9 OBESITY, UNSPECIFIED CLASS, UNSPECIFIED OBESITY TYPE, UNSPECIFIED WHETHER SERIOUS COMORBIDITY PRESENT: ICD-10-CM

## 2025-03-06 DIAGNOSIS — M25.571 BILATERAL ANKLE PAIN, UNSPECIFIED CHRONICITY: Primary | ICD-10-CM

## 2025-03-06 DIAGNOSIS — E03.9 HYPOTHYROIDISM, UNSPECIFIED TYPE: ICD-10-CM

## 2025-03-06 DIAGNOSIS — M25.571 BILATERAL ANKLE PAIN, UNSPECIFIED CHRONICITY: ICD-10-CM

## 2025-03-06 DIAGNOSIS — M25.572 BILATERAL ANKLE PAIN, UNSPECIFIED CHRONICITY: Primary | ICD-10-CM

## 2025-03-06 DIAGNOSIS — M25.572 BILATERAL ANKLE PAIN, UNSPECIFIED CHRONICITY: ICD-10-CM

## 2025-03-06 DIAGNOSIS — F32.A DEPRESSION, UNSPECIFIED DEPRESSION TYPE: ICD-10-CM

## 2025-03-06 LAB
CRP SERPL HS-MCNC: <3 MG/L (ref 0–5)
ERYTHROCYTE [SEDIMENTATION RATE] IN BLOOD BY PHOTOMETRIC METHOD: 1 MM/HR (ref 0–30)
RHEUMATOID FACT SER NEPH-ACNC: <10 IU/ML (ref 0–13)
TSH SERPL DL<=0.05 MIU/L-ACNC: 2.2 UIU/ML (ref 0.27–4.2)

## 2025-03-06 RX ORDER — PHENTERMINE HYDROCHLORIDE 37.5 MG/1
37.5 TABLET ORAL
Qty: 30 TABLET | Refills: 0 | Status: SHIPPED | OUTPATIENT
Start: 2025-03-06 | End: 2025-04-05

## 2025-03-06 ASSESSMENT — ENCOUNTER SYMPTOMS
SHORTNESS OF BREATH: 0
ABDOMINAL PAIN: 0
COUGH: 0
BACK PAIN: 0

## 2025-03-06 NOTE — PROGRESS NOTES
left ovarier        Family History   Problem Relation Age of Onset    Anemia Mother     Asthma Mother     Cancer Mother         Multiple Myeloma    Brain Cancer Father     Cancer Father         Brain    Diabetes Maternal Grandfather     Obesity Maternal Grandmother     Heart Disease Paternal Grandfather     Cancer Paternal Grandmother         Breast    Asthma Brother     Cancer Paternal Cousin         Breast    Mental Illness Maternal Aunt         Bipolar    Mental Illness Maternal Uncle           Social History     Tobacco Use    Smoking status: Never    Smokeless tobacco: Never   Substance Use Topics    Alcohol use: Yes     Comment: Socially, dont drink that often.      Current Outpatient Medications   Medication Sig Dispense Refill    phentermine (ADIPEX-P) 37.5 MG tablet Take 1 tablet by mouth every morning (before breakfast) for 30 days. Max Daily Amount: 37.5 mg 30 tablet 0    levothyroxine (SYNTHROID) 25 MCG tablet Take 1 tablet by mouth daily 90 tablet 0    ibuprofen (ADVIL;MOTRIN) 800 MG tablet TAKE 1 TABLET BY MOUTH 3 TIMES A DAY WITH A MEAL 90 tablet 3    buPROPion (WELLBUTRIN) 75 MG tablet Take 1 tablet by mouth 2 times daily 60 tablet 3    Fexofenadine HCl (ALLEGRA ALLERGY PO) Take by mouth      vitamin C (ASCORBIC ACID) 500 MG tablet Take 1 tablet by mouth daily      VITAMIN D PO Take by mouth      Doxylamine Succinate, Sleep, (UNISOM PO) Take by mouth       No current facility-administered medications for this visit.     Allergies   Allergen Reactions    Latex        Health Maintenance   Topic Date Due    Hepatitis B vaccine (1 of 3 - 19+ 3-dose series) Never done    Pneumococcal 50+ years Vaccine (1 of 1 - PCV) Never done    Depression Monitoring  03/05/2026    Breast cancer screen  09/26/2026    DTaP/Tdap/Td vaccine (2 - Td or Tdap) 07/05/2028    Lipids  07/29/2029    Colorectal Cancer Screen  12/09/2031    Flu vaccine  Completed    Shingles vaccine  Completed    COVID-19 Vaccine  Completed

## 2025-03-07 LAB
ANA SER QL IA: NEGATIVE
DSDNA IGG SER QL IA: 0.7 IU/ML
NUCLEAR IGG SER IA-RTO: 0.3 U/ML

## 2025-03-10 ENCOUNTER — RESULTS FOLLOW-UP (OUTPATIENT)
Dept: PRIMARY CARE CLINIC | Age: 55
End: 2025-03-10

## 2025-03-19 RX ORDER — BUPROPION HYDROCHLORIDE 75 MG/1
75 TABLET ORAL 2 TIMES DAILY
Qty: 60 TABLET | Refills: 3 | Status: SHIPPED | OUTPATIENT
Start: 2025-03-19

## 2025-03-31 DIAGNOSIS — E66.9 OBESITY, UNSPECIFIED CLASS, UNSPECIFIED OBESITY TYPE, UNSPECIFIED WHETHER SERIOUS COMORBIDITY PRESENT: ICD-10-CM

## 2025-03-31 RX ORDER — PHENTERMINE HYDROCHLORIDE 37.5 MG/1
37.5 TABLET ORAL
Qty: 30 TABLET | Refills: 0 | Status: SHIPPED | OUTPATIENT
Start: 2025-03-31 | End: 2025-04-30

## 2025-04-07 ENCOUNTER — OFFICE VISIT (OUTPATIENT)
Dept: PRIMARY CARE CLINIC | Age: 55
End: 2025-04-07
Payer: COMMERCIAL

## 2025-04-07 VITALS
BODY MASS INDEX: 28.27 KG/M2 | HEART RATE: 76 BPM | OXYGEN SATURATION: 96 % | RESPIRATION RATE: 14 BRPM | SYSTOLIC BLOOD PRESSURE: 118 MMHG | WEIGHT: 177.8 LBS | DIASTOLIC BLOOD PRESSURE: 80 MMHG

## 2025-04-07 DIAGNOSIS — M25.572 BILATERAL ANKLE PAIN, UNSPECIFIED CHRONICITY: ICD-10-CM

## 2025-04-07 DIAGNOSIS — E66.9 OBESITY, UNSPECIFIED CLASS, UNSPECIFIED OBESITY TYPE, UNSPECIFIED WHETHER SERIOUS COMORBIDITY PRESENT: ICD-10-CM

## 2025-04-07 DIAGNOSIS — E03.9 HYPOTHYROIDISM, UNSPECIFIED TYPE: ICD-10-CM

## 2025-04-07 DIAGNOSIS — F51.01 PRIMARY INSOMNIA: Primary | ICD-10-CM

## 2025-04-07 DIAGNOSIS — F32.A DEPRESSION, UNSPECIFIED DEPRESSION TYPE: ICD-10-CM

## 2025-04-07 DIAGNOSIS — M25.571 BILATERAL ANKLE PAIN, UNSPECIFIED CHRONICITY: ICD-10-CM

## 2025-04-07 PROCEDURE — 99214 OFFICE O/P EST MOD 30 MIN: CPT | Performed by: NURSE PRACTITIONER

## 2025-04-07 RX ORDER — GINSENG 100 MG
50 CAPSULE ORAL DAILY
COMMUNITY
Start: 2024-08-01

## 2025-04-07 RX ORDER — ZOLPIDEM TARTRATE 5 MG/1
5 TABLET ORAL NIGHTLY PRN
Qty: 30 TABLET | Refills: 0 | Status: SHIPPED | OUTPATIENT
Start: 2025-04-07 | End: 2025-05-07

## 2025-04-07 SDOH — ECONOMIC STABILITY: FOOD INSECURITY: WITHIN THE PAST 12 MONTHS, THE FOOD YOU BOUGHT JUST DIDN'T LAST AND YOU DIDN'T HAVE MONEY TO GET MORE.: NEVER TRUE

## 2025-04-07 SDOH — ECONOMIC STABILITY: FOOD INSECURITY: WITHIN THE PAST 12 MONTHS, YOU WORRIED THAT YOUR FOOD WOULD RUN OUT BEFORE YOU GOT MONEY TO BUY MORE.: NEVER TRUE

## 2025-04-07 ASSESSMENT — PATIENT HEALTH QUESTIONNAIRE - PHQ9
9. THOUGHTS THAT YOU WOULD BE BETTER OFF DEAD, OR OF HURTING YOURSELF: NOT AT ALL
SUM OF ALL RESPONSES TO PHQ QUESTIONS 1-9: 0
2. FEELING DOWN, DEPRESSED OR HOPELESS: NOT AT ALL
7. TROUBLE CONCENTRATING ON THINGS, SUCH AS READING THE NEWSPAPER OR WATCHING TELEVISION: NOT AT ALL
4. FEELING TIRED OR HAVING LITTLE ENERGY: NOT AT ALL
6. FEELING BAD ABOUT YOURSELF - OR THAT YOU ARE A FAILURE OR HAVE LET YOURSELF OR YOUR FAMILY DOWN: NOT AT ALL
10. IF YOU CHECKED OFF ANY PROBLEMS, HOW DIFFICULT HAVE THESE PROBLEMS MADE IT FOR YOU TO DO YOUR WORK, TAKE CARE OF THINGS AT HOME, OR GET ALONG WITH OTHER PEOPLE: NOT DIFFICULT AT ALL
SUM OF ALL RESPONSES TO PHQ QUESTIONS 1-9: 0
SUM OF ALL RESPONSES TO PHQ QUESTIONS 1-9: 0
1. LITTLE INTEREST OR PLEASURE IN DOING THINGS: NOT AT ALL
SUM OF ALL RESPONSES TO PHQ QUESTIONS 1-9: 0
8. MOVING OR SPEAKING SO SLOWLY THAT OTHER PEOPLE COULD HAVE NOTICED. OR THE OPPOSITE, BEING SO FIGETY OR RESTLESS THAT YOU HAVE BEEN MOVING AROUND A LOT MORE THAN USUAL: NOT AT ALL
3. TROUBLE FALLING OR STAYING ASLEEP: NOT AT ALL
5. POOR APPETITE OR OVEREATING: NOT AT ALL

## 2025-04-07 ASSESSMENT — ENCOUNTER SYMPTOMS
ABDOMINAL PAIN: 0
COUGH: 0
BACK PAIN: 0
SHORTNESS OF BREATH: 0

## 2025-04-07 NOTE — PROGRESS NOTES
[facility]  [facility]  [address] [place] [address] [address]  [address]  [facility] [address]  Dept: [phone]  Dept Fax: [phone]    Britni Acevedo is a 54 y.o. female who presentstoday for her medical conditions/complaints as noted below.  Britni Acevedo is c/o of  Chief Complaint   Patient presents with    1 Month Follow-Up     -weight check  -Medication check        HPI:     Presents for a follow up visit   BP well controlled  Has lost 3lb since LOV    Started Adipex one month ago for wt loss   Denies side effects with medication   Continue to educate on protein and water intake     C/o bilateral ankle pain that radiates up to calf- this has been going on since approx October  No mechanism of injury  Continues to come/go but not as frequent   Started mag supplement however has been off for one week   Encouraged compression stockings, update office if no improvement     Continues to not sleep well, states she will always have issues  Has tried melatonin, zquil, unisom and tylenol PM without improvement  Dogs, hot flashes continue  Used ambien in the past with success  Would like to resume use- sent rx     Depression is at baseline  At work she is busy but at home she is able to think more  Using wellbutrin daily  Denies need to increase medications at this time      Denies any other problems/concerns        Hemoglobin A1C (%)   Date Value   07/29/2024 5.0   07/27/2023 5.1   07/21/2022 5.0             ( goal A1C is < 7)   No components found for: \"LABMICR\"  No components found for: \"LDLCHOLESTEROL\", \"LDLCALC\"    (goal LDL is <100)   AST (U/L)   Date Value   07/29/2024 22     ALT (U/L)   Date Value   07/29/2024 18     BUN (mg/dL)   Date Value   07/29/2024 15     BP Readings from Last 3 Encounters:   04/07/25 118/80   03/06/25 116/76   07/29/24 118/78          ([id number])    Past Medical History:   Diagnosis Date    Depression 1/11/2024    Finding my mother dead      Past Surgical

## 2025-04-20 DIAGNOSIS — E03.9 HYPOTHYROIDISM, UNSPECIFIED TYPE: ICD-10-CM

## 2025-04-21 RX ORDER — LEVOTHYROXINE SODIUM 25 UG/1
25 TABLET ORAL DAILY
Qty: 90 TABLET | Refills: 0 | Status: SHIPPED | OUTPATIENT
Start: 2025-04-21

## 2025-04-23 RX ORDER — IBUPROFEN 800 MG/1
TABLET, FILM COATED ORAL
Qty: 90 TABLET | Refills: 3 | Status: SHIPPED | OUTPATIENT
Start: 2025-04-23

## 2025-05-02 DIAGNOSIS — E66.9 OBESITY, UNSPECIFIED CLASS, UNSPECIFIED OBESITY TYPE, UNSPECIFIED WHETHER SERIOUS COMORBIDITY PRESENT: ICD-10-CM

## 2025-05-02 RX ORDER — PHENTERMINE HYDROCHLORIDE 37.5 MG/1
37.5 TABLET ORAL
Qty: 30 TABLET | Refills: 0 | Status: SHIPPED | OUTPATIENT
Start: 2025-05-02 | End: 2025-06-01

## 2025-05-06 DIAGNOSIS — F51.01 PRIMARY INSOMNIA: ICD-10-CM

## 2025-05-07 RX ORDER — ZOLPIDEM TARTRATE 5 MG/1
5 TABLET ORAL NIGHTLY PRN
Qty: 30 TABLET | Refills: 0 | Status: SHIPPED | OUTPATIENT
Start: 2025-05-07 | End: 2025-06-06

## 2025-06-02 ENCOUNTER — TELEPHONE (OUTPATIENT)
Dept: PRIMARY CARE CLINIC | Age: 55
End: 2025-06-02

## 2025-06-02 DIAGNOSIS — E66.9 OBESITY, UNSPECIFIED CLASS, UNSPECIFIED OBESITY TYPE, UNSPECIFIED WHETHER SERIOUS COMORBIDITY PRESENT: Primary | ICD-10-CM

## 2025-06-02 DIAGNOSIS — F51.01 PRIMARY INSOMNIA: ICD-10-CM

## 2025-06-02 RX ORDER — ZOLPIDEM TARTRATE 5 MG/1
5 TABLET ORAL NIGHTLY PRN
Qty: 30 TABLET | Refills: 0 | Status: SHIPPED | OUTPATIENT
Start: 2025-06-02 | End: 2025-07-02

## 2025-06-02 RX ORDER — PHENTERMINE HYDROCHLORIDE 37.5 MG/1
37.5 TABLET ORAL
Qty: 30 TABLET | Refills: 0 | Status: SHIPPED | OUTPATIENT
Start: 2025-06-02 | End: 2025-07-02

## 2025-06-02 NOTE — TELEPHONE ENCOUNTER
Pt called into office requesting her \"weight loss pill\" to be refilled. Please send prescription to [facility] pharmacy.

## 2025-07-02 DIAGNOSIS — F51.01 PRIMARY INSOMNIA: ICD-10-CM

## 2025-07-02 DIAGNOSIS — E66.9 OBESITY, UNSPECIFIED CLASS, UNSPECIFIED OBESITY TYPE, UNSPECIFIED WHETHER SERIOUS COMORBIDITY PRESENT: ICD-10-CM

## 2025-07-03 RX ORDER — ZOLPIDEM TARTRATE 5 MG/1
5 TABLET ORAL NIGHTLY PRN
Qty: 30 TABLET | Refills: 0 | Status: SHIPPED | OUTPATIENT
Start: 2025-07-03 | End: 2025-08-02

## 2025-07-03 RX ORDER — PHENTERMINE HYDROCHLORIDE 37.5 MG/1
37.5 TABLET ORAL
Qty: 30 TABLET | Refills: 0 | Status: SHIPPED | OUTPATIENT
Start: 2025-07-03 | End: 2025-08-02

## 2025-07-07 ENCOUNTER — RESULTS FOLLOW-UP (OUTPATIENT)
Dept: PRIMARY CARE CLINIC | Age: 55
End: 2025-07-07

## 2025-07-07 ENCOUNTER — OFFICE VISIT (OUTPATIENT)
Dept: PRIMARY CARE CLINIC | Age: 55
End: 2025-07-07
Payer: COMMERCIAL

## 2025-07-07 ENCOUNTER — HOSPITAL ENCOUNTER (OUTPATIENT)
Age: 55
Setting detail: SPECIMEN
Discharge: HOME | End: 2025-07-07

## 2025-07-07 VITALS
HEART RATE: 73 BPM | BODY MASS INDEX: 24.67 KG/M2 | WEIGHT: 155.2 LBS | OXYGEN SATURATION: 100 % | SYSTOLIC BLOOD PRESSURE: 122 MMHG | DIASTOLIC BLOOD PRESSURE: 74 MMHG

## 2025-07-07 DIAGNOSIS — N92.6 IRREGULAR MENSES: ICD-10-CM

## 2025-07-07 DIAGNOSIS — Z13.220 SCREENING FOR LIPID DISORDERS: ICD-10-CM

## 2025-07-07 DIAGNOSIS — Z13.1 SCREENING FOR DIABETES MELLITUS: ICD-10-CM

## 2025-07-07 DIAGNOSIS — Z13.29 SCREENING FOR THYROID DISORDER: ICD-10-CM

## 2025-07-07 DIAGNOSIS — Z00.00 ENCOUNTER FOR GENERAL ADULT MEDICAL EXAMINATION W/O ABNORMAL FINDINGS: Primary | ICD-10-CM

## 2025-07-07 DIAGNOSIS — Z13.0 SCREENING FOR DEFICIENCY ANEMIA: ICD-10-CM

## 2025-07-07 LAB
ALBUMIN SERPL-MCNC: 4.6 G/DL (ref 3.5–5.2)
ALBUMIN/GLOB SERPL: 2.1 {RATIO} (ref 1–2.5)
ALP SERPL-CCNC: 79 U/L (ref 35–104)
ALT SERPL-CCNC: 27 U/L (ref 10–35)
ANION GAP SERPL CALCULATED.3IONS-SCNC: 12 MMOL/L (ref 9–16)
AST SERPL-CCNC: 23 U/L (ref 10–35)
BILIRUB SERPL-MCNC: 0.3 MG/DL (ref 0–1.2)
BUN SERPL-MCNC: 15 MG/DL (ref 6–20)
CALCIUM SERPL-MCNC: 10.3 MG/DL (ref 8.6–10.4)
CHLORIDE SERPL-SCNC: 103 MMOL/L (ref 98–107)
CHOLEST SERPL-MCNC: 186 MG/DL (ref 0–199)
CHOLESTEROL/HDL RATIO: 4
CO2 SERPL-SCNC: 27 MMOL/L (ref 20–31)
CREAT SERPL-MCNC: 0.7 MG/DL (ref 0.6–0.9)
ERYTHROCYTE [DISTWIDTH] IN BLOOD BY AUTOMATED COUNT: 12.9 % (ref 11.8–14.4)
EST. AVERAGE GLUCOSE BLD GHB EST-MCNC: 94 MG/DL
FSH SERPL-ACNC: 97.9 MIU/ML
GFR, ESTIMATED: >90 ML/MIN/1.73M2
GLUCOSE SERPL-MCNC: 94 MG/DL (ref 74–99)
HBA1C MFR BLD: 4.9 % (ref 4–6)
HCT VFR BLD AUTO: 44 % (ref 36.3–47.1)
HDLC SERPL-MCNC: 47 MG/DL
HGB BLD-MCNC: 14.1 G/DL (ref 11.9–15.1)
LDLC SERPL CALC-MCNC: 119 MG/DL (ref 0–100)
LH SERPL-ACNC: 47.9 MIU/ML (ref 1.7–8.6)
MCH RBC QN AUTO: 30.8 PG (ref 25.2–33.5)
MCHC RBC AUTO-ENTMCNC: 32 G/DL (ref 28.4–34.8)
MCV RBC AUTO: 96.1 FL (ref 82.6–102.9)
NRBC BLD-RTO: 0 PER 100 WBC
PLATELET # BLD AUTO: 254 K/UL (ref 138–453)
PMV BLD AUTO: 11.4 FL (ref 8.1–13.5)
POTASSIUM SERPL-SCNC: 4.8 MMOL/L (ref 3.7–5.3)
PROT SERPL-MCNC: 6.8 G/DL (ref 6.6–8.7)
RBC # BLD AUTO: 4.58 M/UL (ref 3.95–5.11)
SODIUM SERPL-SCNC: 142 MMOL/L (ref 136–145)
TRIGL SERPL-MCNC: 102 MG/DL
TSH SERPL DL<=0.05 MIU/L-ACNC: 1.92 UIU/ML (ref 0.27–4.2)
VLDLC SERPL CALC-MCNC: 20 MG/DL (ref 1–30)
WBC OTHER # BLD: 7.3 K/UL (ref 3.5–11.3)

## 2025-07-07 PROCEDURE — 99396 PREV VISIT EST AGE 40-64: CPT | Performed by: NURSE PRACTITIONER

## 2025-07-07 ASSESSMENT — ENCOUNTER SYMPTOMS
ABDOMINAL PAIN: 0
BACK PAIN: 0
SHORTNESS OF BREATH: 0
COUGH: 0

## 2025-07-07 NOTE — PROGRESS NOTES
[facility]  [facility]  [address] [place] [address] [address]  [address]  [facility] [address]  Dept: [phone]  Dept Fax: [phone]    Britni Acevedo is a 54 y.o. female who presentstoday for her medical conditions/complaints as noted below.  Britni Acevedo is c/o of  Chief Complaint   Patient presents with    Medication Check     Weight management     Annual Exam         HPI:     Presents for annual exam  BP well controlled  Has lost 22lb since LOV- Congratulated patient  Working on diet/exercise    Using adipex daily  Feels it helps to curb appetite  Denies any side effects with use of med    Insomnia stable with use of ambien prn    Willing to update annual labs  Would like hormone levels checked    Mammogram up to date per GYN  Colonoscopy current through 2031    Denies any other problems/concerns        Hemoglobin A1C (%)   Date Value   07/29/2024 5.0   07/27/2023 5.1   07/21/2022 5.0             ( goal A1C is < 7)   No components found for: \"LABMICR\"  No components found for: \"LDLCHOLESTEROL\", \"LDLCALC\"    (goal LDL is <100)   AST (U/L)   Date Value   07/29/2024 22     ALT (U/L)   Date Value   07/29/2024 18     BUN (mg/dL)   Date Value   07/29/2024 15     BP Readings from Last 3 Encounters:   07/07/25 122/74   04/07/25 118/80   03/06/25 116/76          ([id number])    Past Medical History:   Diagnosis Date    Depression 1/11/2024    Finding my mother dead      Past Surgical History:   Procedure Laterality Date    COLONOSCOPY  12/09/2021    COLONOSCOPY N/A 12/9/2021    COLORECTAL CANCER SCREENING, NOT HIGH RISK performed by Amaury Bey DO at [facility] OR    HYSTERECTOMY, TOTAL ABDOMINAL (CERVIX REMOVED)      left ovarier        Family History   Problem Relation Age of Onset    Anemia Mother     Asthma Mother     Cancer Mother         Multiple Myeloma    Brain Cancer Father     Cancer Father         Brain    Diabetes Maternal Grandfather     Obesity Maternal Grandmother

## 2025-07-10 LAB
ESTRADIOL LEVEL: 7.3 PG/ML
ESTROGEN TOTAL: 26.9 PG/ML
ESTRONE SERPL-MCNC: 19.6 PG/ML

## 2025-07-18 DIAGNOSIS — E03.9 HYPOTHYROIDISM, UNSPECIFIED TYPE: ICD-10-CM

## 2025-07-18 RX ORDER — LEVOTHYROXINE SODIUM 25 UG/1
25 TABLET ORAL DAILY
Qty: 90 TABLET | Refills: 0 | Status: SHIPPED | OUTPATIENT
Start: 2025-07-18

## 2025-07-19 DIAGNOSIS — E03.9 HYPOTHYROIDISM, UNSPECIFIED TYPE: ICD-10-CM

## 2025-07-21 RX ORDER — LEVOTHYROXINE SODIUM 25 UG/1
25 TABLET ORAL DAILY
Qty: 90 TABLET | Refills: 0 | Status: SHIPPED | OUTPATIENT
Start: 2025-07-21

## 2025-07-22 RX ORDER — BUPROPION HYDROCHLORIDE 75 MG/1
75 TABLET ORAL 2 TIMES DAILY
Qty: 60 TABLET | Refills: 3 | Status: SHIPPED | OUTPATIENT
Start: 2025-07-22

## 2025-07-24 ENCOUNTER — PATIENT MESSAGE (OUTPATIENT)
Dept: PRIMARY CARE CLINIC | Age: 55
End: 2025-07-24

## 2025-07-24 DIAGNOSIS — F51.01 PRIMARY INSOMNIA: ICD-10-CM

## 2025-07-24 DIAGNOSIS — E66.9 OBESITY, UNSPECIFIED CLASS, UNSPECIFIED OBESITY TYPE, UNSPECIFIED WHETHER SERIOUS COMORBIDITY PRESENT: ICD-10-CM

## 2025-07-29 RX ORDER — PHENTERMINE HYDROCHLORIDE 37.5 MG/1
37.5 TABLET ORAL
Qty: 30 TABLET | Refills: 0 | Status: SHIPPED | OUTPATIENT
Start: 2025-07-29 | End: 2025-07-30 | Stop reason: SDUPTHER

## 2025-07-29 RX ORDER — ZOLPIDEM TARTRATE 5 MG/1
5 TABLET ORAL NIGHTLY PRN
Qty: 30 TABLET | Refills: 0 | Status: SHIPPED | OUTPATIENT
Start: 2025-07-29 | End: 2025-07-30 | Stop reason: SDUPTHER

## 2025-07-29 NOTE — TELEPHONE ENCOUNTER
Called pharmacy and gave verbal auth to fill medication early for the ambien and adipex for 8/1/25 per pcp

## 2025-07-30 DIAGNOSIS — E66.9 OBESITY, UNSPECIFIED CLASS, UNSPECIFIED OBESITY TYPE, UNSPECIFIED WHETHER SERIOUS COMORBIDITY PRESENT: ICD-10-CM

## 2025-07-30 DIAGNOSIS — F51.01 PRIMARY INSOMNIA: ICD-10-CM

## 2025-08-01 RX ORDER — ZOLPIDEM TARTRATE 5 MG/1
5 TABLET ORAL NIGHTLY PRN
Qty: 30 TABLET | Refills: 0 | Status: SHIPPED | OUTPATIENT
Start: 2025-08-01 | End: 2025-08-31

## 2025-08-01 RX ORDER — PHENTERMINE HYDROCHLORIDE 37.5 MG/1
37.5 TABLET ORAL
Qty: 30 TABLET | Refills: 0 | Status: SHIPPED | OUTPATIENT
Start: 2025-08-01 | End: 2025-08-31

## 2025-08-14 ENCOUNTER — OFFICE VISIT (OUTPATIENT)
Dept: FAMILY MEDICINE CLINIC | Age: 55
End: 2025-08-14
Payer: COMMERCIAL

## 2025-08-14 VITALS
HEIGHT: 67 IN | TEMPERATURE: 97.6 F | HEART RATE: 78 BPM | WEIGHT: 156 LBS | OXYGEN SATURATION: 98 % | BODY MASS INDEX: 24.48 KG/M2 | SYSTOLIC BLOOD PRESSURE: 114 MMHG | DIASTOLIC BLOOD PRESSURE: 76 MMHG

## 2025-08-14 DIAGNOSIS — H65.192 ACUTE MEE (MIDDLE EAR EFFUSION), LEFT: ICD-10-CM

## 2025-08-14 DIAGNOSIS — J22 ACUTE RESPIRATORY INFECTION: Primary | ICD-10-CM

## 2025-08-14 PROCEDURE — 99213 OFFICE O/P EST LOW 20 MIN: CPT | Performed by: NURSE PRACTITIONER

## 2025-08-14 RX ORDER — AZITHROMYCIN 250 MG/1
TABLET, FILM COATED ORAL
Qty: 1 PACKET | Refills: 0 | Status: SHIPPED | OUTPATIENT
Start: 2025-08-14 | End: 2025-08-24

## 2025-08-19 ENCOUNTER — E-VISIT (OUTPATIENT)
Dept: PRIMARY CARE CLINIC | Age: 55
End: 2025-08-19
Payer: COMMERCIAL

## 2025-08-19 DIAGNOSIS — N89.8 VAGINAL DISCHARGE: Primary | ICD-10-CM

## 2025-08-19 PROCEDURE — 99422 OL DIG E/M SVC 11-20 MIN: CPT | Performed by: NURSE PRACTITIONER

## 2025-08-19 RX ORDER — FLUCONAZOLE 150 MG/1
150 TABLET ORAL
Qty: 2 TABLET | Refills: 0 | Status: SHIPPED | OUTPATIENT
Start: 2025-08-19 | End: 2025-08-25

## 2025-08-26 DIAGNOSIS — B35.1 TOENAIL FUNGUS: Primary | ICD-10-CM

## 2025-09-02 DIAGNOSIS — F51.01 PRIMARY INSOMNIA: ICD-10-CM

## 2025-09-02 DIAGNOSIS — E66.9 OBESITY, UNSPECIFIED CLASS, UNSPECIFIED OBESITY TYPE, UNSPECIFIED WHETHER SERIOUS COMORBIDITY PRESENT: ICD-10-CM

## 2025-09-02 RX ORDER — PHENTERMINE HYDROCHLORIDE 37.5 MG/1
TABLET ORAL
Qty: 30 TABLET | Refills: 0 | Status: SHIPPED | OUTPATIENT
Start: 2025-09-02 | End: 2025-10-02

## 2025-09-02 RX ORDER — ZOLPIDEM TARTRATE 5 MG/1
TABLET ORAL
Qty: 30 TABLET | Refills: 0 | Status: SHIPPED | OUTPATIENT
Start: 2025-09-02 | End: 2025-09-03 | Stop reason: SDUPTHER

## (undated) DEVICE — GOWN,AURORA,NONRNF,XL,30/CS: Brand: MEDLINE

## (undated) DEVICE — SYRINGE MED 50ML LUERLOCK TIP

## (undated) DEVICE — JELLY,LUBE,STERILE,FLIP TOP,TUBE,2-OZ: Brand: MEDLINE

## (undated) DEVICE — CONNECTOR TBNG AUX H2O JET DISP FOR OLY 160/180 SER

## (undated) DEVICE — BASIN EMSIS 700ML GRAPHITE PLAS KID SHP GRAD

## (undated) DEVICE — TUBING, SUCTION, 3/16" X 10', STRAIGHT: Brand: MEDLINE

## (undated) DEVICE — POLYP TRAP: Brand: TRAPEASE®

## (undated) DEVICE — Device: Brand: DEFENDO VALVE AND CONNECTOR KIT

## (undated) DEVICE — FLUFF UNDERPAD,MODERATE: Brand: WINGS

## (undated) DEVICE — SPONGE GZ W4XL4IN RAYON POLY FILL CVR W/ NONWOVEN FAB

## (undated) DEVICE — 4-PORT MANIFOLD: Brand: NEPTUNE 2

## (undated) DEVICE — ADAPTER,CATHETER/SYRINGE/LUER,STERILE: Brand: MEDLINE